# Patient Record
Sex: FEMALE | Race: WHITE | Employment: FULL TIME | URBAN - METROPOLITAN AREA
[De-identification: names, ages, dates, MRNs, and addresses within clinical notes are randomized per-mention and may not be internally consistent; named-entity substitution may affect disease eponyms.]

---

## 2017-05-03 ENCOUNTER — EMPLOYEE WELLNESS (OUTPATIENT)
Dept: OTHER | Age: 62
End: 2017-05-03

## 2017-05-03 LAB
CHOLESTEROL, TOTAL: 225 MG/DL (ref 0–199)
GLUCOSE BLD-MCNC: 92 MG/DL (ref 74–107)
HDLC SERPL-MCNC: 58 MG/DL
LDL CHOLESTEROL CALCULATED: 129 MG/DL (ref 0–99)
TRIGL SERPL-MCNC: 190 MG/DL (ref 0–149)

## 2018-03-20 VITALS — WEIGHT: 136 LBS | BODY MASS INDEX: 21.95 KG/M2

## 2018-05-15 ENCOUNTER — EMPLOYEE WELLNESS (OUTPATIENT)
Dept: OTHER | Age: 63
End: 2018-05-15

## 2018-05-15 LAB
CHOLESTEROL, TOTAL: 143 MG/DL (ref 0–199)
GLUCOSE BLD-MCNC: 89 MG/DL (ref 74–107)
HDLC SERPL-MCNC: 53 MG/DL
LDL CHOLESTEROL CALCULATED: 65 MG/DL (ref 0–99)
TRIGL SERPL-MCNC: 126 MG/DL (ref 0–149)

## 2018-05-21 VITALS — BODY MASS INDEX: 21.63 KG/M2 | WEIGHT: 134 LBS

## 2018-05-24 ENCOUNTER — HOSPITAL ENCOUNTER (OUTPATIENT)
Dept: GENERAL RADIOLOGY | Age: 63
Discharge: HOME | End: 2018-05-26
Payer: COMMERCIAL

## 2018-05-24 ENCOUNTER — HOSPITAL ENCOUNTER (OUTPATIENT)
Age: 63
Discharge: HOME | End: 2018-05-26
Payer: COMMERCIAL

## 2018-05-24 DIAGNOSIS — M54.5 LOW BACK PAIN, UNSPECIFIED BACK PAIN LATERALITY, UNSPECIFIED CHRONICITY, WITH SCIATICA PRESENCE UNSPECIFIED: ICD-10-CM

## 2018-05-24 PROCEDURE — 72110 X-RAY EXAM L-2 SPINE 4/>VWS: CPT

## 2018-05-24 PROCEDURE — 72072 X-RAY EXAM THORAC SPINE 3VWS: CPT

## 2018-05-30 ENCOUNTER — HOSPITAL ENCOUNTER (OUTPATIENT)
Dept: PHYSICAL THERAPY | Age: 63
Setting detail: THERAPIES SERIES
Discharge: HOME | End: 2018-05-30
Payer: COMMERCIAL

## 2018-05-30 PROCEDURE — 97161 PT EVAL LOW COMPLEX 20 MIN: CPT

## 2018-05-30 PROCEDURE — G8978 MOBILITY CURRENT STATUS: HCPCS

## 2018-05-30 PROCEDURE — G8979 MOBILITY GOAL STATUS: HCPCS

## 2018-06-05 ENCOUNTER — HOSPITAL ENCOUNTER (OUTPATIENT)
Dept: PHYSICAL THERAPY | Age: 63
Setting detail: THERAPIES SERIES
Discharge: HOME | End: 2018-06-05
Payer: COMMERCIAL

## 2018-06-05 PROCEDURE — 97530 THERAPEUTIC ACTIVITIES: CPT

## 2018-06-05 PROCEDURE — 97110 THERAPEUTIC EXERCISES: CPT

## 2018-06-08 ENCOUNTER — HOSPITAL ENCOUNTER (OUTPATIENT)
Dept: PHYSICAL THERAPY | Age: 63
Setting detail: THERAPIES SERIES
Discharge: HOME | End: 2018-06-08
Payer: COMMERCIAL

## 2018-06-08 PROCEDURE — 97110 THERAPEUTIC EXERCISES: CPT

## 2018-06-14 ENCOUNTER — HOSPITAL ENCOUNTER (OUTPATIENT)
Dept: PHYSICAL THERAPY | Age: 63
Setting detail: THERAPIES SERIES
Discharge: HOME | End: 2018-06-14
Payer: COMMERCIAL

## 2018-06-14 PROCEDURE — 97110 THERAPEUTIC EXERCISES: CPT

## 2018-06-15 PROCEDURE — G8980 MOBILITY D/C STATUS: HCPCS

## 2018-06-15 PROCEDURE — G8979 MOBILITY GOAL STATUS: HCPCS

## 2018-06-28 ENCOUNTER — HOSPITAL ENCOUNTER (OUTPATIENT)
Dept: GENERAL RADIOLOGY | Age: 63
Discharge: HOME | End: 2018-06-30
Payer: COMMERCIAL

## 2018-06-28 DIAGNOSIS — Z12.31 VISIT FOR SCREENING MAMMOGRAM: ICD-10-CM

## 2018-06-28 PROCEDURE — 77063 BREAST TOMOSYNTHESIS BI: CPT

## 2018-12-15 ENCOUNTER — NURSE TRIAGE (OUTPATIENT)
Dept: OTHER | Facility: CLINIC | Age: 63
End: 2018-12-15

## 2018-12-20 ENCOUNTER — HOSPITAL ENCOUNTER (OUTPATIENT)
Dept: CT IMAGING | Age: 63
Discharge: HOME | End: 2018-12-22
Payer: COMMERCIAL

## 2018-12-20 DIAGNOSIS — S92.211A CLOSED DISPLACED FRACTURE OF CUBOID OF RIGHT FOOT, INITIAL ENCOUNTER: ICD-10-CM

## 2018-12-20 PROCEDURE — 73700 CT LOWER EXTREMITY W/O DYE: CPT

## 2019-01-29 ENCOUNTER — HOSPITAL ENCOUNTER (OUTPATIENT)
Age: 64
Discharge: HOME | End: 2019-01-29
Payer: COMMERCIAL

## 2019-01-29 LAB
ALBUMIN SERPL-MCNC: 4.2 G/DL (ref 3.5–5.2)
ALP BLD-CCNC: 47 U/L (ref 35–104)
ALT SERPL-CCNC: 28 U/L (ref 0–32)
ANION GAP SERPL CALCULATED.3IONS-SCNC: 15 MMOL/L (ref 7–16)
AST SERPL-CCNC: 27 U/L (ref 0–31)
BILIRUB SERPL-MCNC: 0.4 MG/DL (ref 0–1.2)
BUN BLDV-MCNC: 33 MG/DL (ref 8–23)
CALCIUM SERPL-MCNC: 9.2 MG/DL (ref 8.6–10.2)
CHLORIDE BLD-SCNC: 102 MMOL/L (ref 98–107)
CHOLESTEROL, TOTAL: 187 MG/DL (ref 0–199)
CO2: 25 MMOL/L (ref 22–29)
CREAT SERPL-MCNC: 1.2 MG/DL (ref 0.5–1)
GFR AFRICAN AMERICAN: 55
GFR NON-AFRICAN AMERICAN: 46 ML/MIN/1.73
GLUCOSE BLD-MCNC: 109 MG/DL (ref 74–99)
HCT VFR BLD CALC: 39.6 % (ref 34–48)
HDLC SERPL-MCNC: 51 MG/DL
HEMOGLOBIN: 13.4 G/DL (ref 11.5–15.5)
LDL CHOLESTEROL CALCULATED: 105 MG/DL (ref 0–99)
MCH RBC QN AUTO: 30.5 PG (ref 26–35)
MCHC RBC AUTO-ENTMCNC: 33.8 % (ref 32–34.5)
MCV RBC AUTO: 90.2 FL (ref 80–99.9)
PDW BLD-RTO: 11.9 FL (ref 11.5–15)
PLATELET # BLD: 226 E9/L (ref 130–450)
PMV BLD AUTO: 10.6 FL (ref 7–12)
POTASSIUM SERPL-SCNC: 3.7 MMOL/L (ref 3.5–5)
RBC # BLD: 4.39 E12/L (ref 3.5–5.5)
SODIUM BLD-SCNC: 142 MMOL/L (ref 132–146)
TOTAL PROTEIN: 7.1 G/DL (ref 6.4–8.3)
TRIGL SERPL-MCNC: 153 MG/DL (ref 0–149)
TSH SERPL DL<=0.05 MIU/L-ACNC: 0.79 UIU/ML (ref 0.27–4.2)
VLDLC SERPL CALC-MCNC: 31 MG/DL
WBC # BLD: 7.1 E9/L (ref 4.5–11.5)

## 2019-01-29 PROCEDURE — 80061 LIPID PANEL: CPT

## 2019-01-29 PROCEDURE — 85027 COMPLETE CBC AUTOMATED: CPT

## 2019-01-29 PROCEDURE — 84443 ASSAY THYROID STIM HORMONE: CPT

## 2019-01-29 PROCEDURE — 36415 COLL VENOUS BLD VENIPUNCTURE: CPT

## 2019-01-29 PROCEDURE — 80053 COMPREHEN METABOLIC PANEL: CPT

## 2019-05-08 ENCOUNTER — HOSPITAL ENCOUNTER (OUTPATIENT)
Dept: PHYSICAL THERAPY | Age: 64
Setting detail: THERAPIES SERIES
Discharge: HOME | End: 2019-05-08
Payer: COMMERCIAL

## 2019-05-08 PROCEDURE — 97162 PT EVAL MOD COMPLEX 30 MIN: CPT

## 2019-05-08 NOTE — PROGRESS NOTES
[address] [facility]                Phone: [phone]   Fax: [phone]    Physical Therapy Daily Treatment Note  Date:  2019    Patient Name:  Damon Luu    :  1955  MRN: [id number]    Referring Physician:  Dr. Kee Pham. [facility]  Insurance Information:  [employer]      Evaluation date:  2019  Diagnosis:  R ankle sprain and foot fractures  Treatment diagnosis:  Decreased R ankle ROM, R ankle weakness, impaired gait mechanics  Evaluating Physical Therapist:  Ko Grijalva PT, DPT        Visit# / total visits:     Time In:    Time Out:      Subjective:      Exercises:   Exercise/Equipment Resistance/Repetitions Other comments      TM vs Bike                Ankle traction                Ankle 5-way                 Gait training                Steps  Reciprocal                                                                                            Assessment/Comments:      Home Exercise Program:        Treatment/Activity Tolerance:  [] Patient tolerated treatment well [] Patient limited by fatigue  [] Patient limited by pain  [] Patient limited by other medical complications  [] Other:     Prognosis: [x] Good [] Fair  [] Poor    Patient Requires Follow-up: [x] Yes  [] No    Plan:   [] Continue per plan of care [] Alter current plan (see comments)  [x] Plan of care initiated [] Hold pending MD visit [] Discharge    Plan for Next Session:  Begin R ankle ROM and strengthening exercises as well as gait and stair training.     See Progress Note: []  Yes  [x]  No          Electronically signed by:  Ko Grijalva PT, DPT
and R heel whip. Steps:  Pt ascended 4 steps with 2 HR's reciprocally and descended 4 steps with 2 HR's step-to pattern modified independent. Additional Comments:  Pt instructed to bring tennis shoes with her to next visit. Pt stated she has GTB at home and was using it for R ankle lateral motion previously. Summary/Assessment:    Pt presents to outpatient physical therapy following R foot fractures and R ankle sprain. Pt would benefit from PT to increase R ankle ROM, improve R ankle strength, and normalize gait mechanics. Plan:     Below checked are areas for improvement during physical therapy POC:        [x]  Pain reduction  []  Balance Improvement       [x]  Strengthening  []  Postural Improvement   [x]  Range of Motion  [x]  Soft Tissue Improvement    [x]  Gait Training   []  Other:      [x]  Home Exercise Program      Pt will be see for 2 visits per week for 4 weeks for a total of 8 visits to accomplish goals set below:        Short/Long Term Goals: (4 weeks)      1. Pt will report decreased R ankle pain to 0-2/10 with activity. 2.  Pt will improve R ankle ROM with passive overpressure to [facility] throughout. 3.  Pt will improve R ankle strength to at least 4+/5 throughout. 4.  Pt will ambulate >300' with normal gait mechanics. 5.  Pt will ascend/descend 12 steps with 1 HR modified independent with reciprocal gait pattern. 6.  Pt will be independent with HEP. Pt's potential for reaching Physical Therapy goals: good.       Time In:  1300  Time Out:  Chuy Zavala, PT, DPT

## 2019-05-10 ENCOUNTER — HOSPITAL ENCOUNTER (OUTPATIENT)
Dept: PHYSICAL THERAPY | Age: 64
Setting detail: THERAPIES SERIES
Discharge: HOME | End: 2019-05-10
Payer: COMMERCIAL

## 2019-05-10 PROCEDURE — 97530 THERAPEUTIC ACTIVITIES: CPT

## 2019-05-10 PROCEDURE — 97110 THERAPEUTIC EXERCISES: CPT

## 2019-05-10 NOTE — PROGRESS NOTES
[address] [facility]                Phone: [phone]   Fax: [phone]    Physical Therapy Daily Treatment Note  Date:  5/10/2019    Patient Name:  Marito Sawant    :  1955  MRN: [id number]    Referring Physician:  Dr. Evi Alvarez. [facility]  Insurance Information:  [employer] Plan      Evaluation date:  2019  Diagnosis:  R ankle sprain and foot fractures  Treatment diagnosis:  Decreased R ankle ROM, R ankle weakness, impaired gait mechanics  Evaluating Physical Therapist:  Navid Little PT, DPT        Visit# / total visits:     Time In:  1300  Time Out:  1400    Subjective:  Pt stated she has been more aware of her pain location since last visit. Pt feels her pain is more in her ankle than her foot now. Pt had not other new complaints this afternoon. Exercises:   Exercise/Equipment Resistance/Repetitions Other comments      TM vs Bike Bike 5:00, L5               Ankle traction With MFB, ankle DF/OH and INV/EV with PT assistance, 2 rounds               Ankle 5-way  Light flexband 20x each direction           BAPS vs Rocker board                Gait training                Steps  Reciprocal                                                                                            Assessment/Comments:  Pt reported decreased pain with ambulation following ankle traction. Pt continues to have min-mod residual swelling around R ankle and into mid-foot. Pt given demonstrations and verbal cues for proper form with ankle 5-way exercises this afternoon. Pt fatigued quickly with ankle strengthening exercises.       Home Exercise Program:  5/10/2019 - ankle 5-way 2x20 2x/day (handout administered)      Treatment/Activity Tolerance:  [x] Patient tolerated treatment well [] Patient limited by fatigue  [] Patient limited by pain  [] Patient limited by other medical complications  [] Other:     Prognosis: [x] Good [] Fair  [] Poor    Patient Requires Follow-up: [x] Yes  []

## 2019-05-14 ENCOUNTER — HOSPITAL ENCOUNTER (OUTPATIENT)
Dept: PHYSICAL THERAPY | Age: 64
Setting detail: THERAPIES SERIES
Discharge: HOME | End: 2019-05-14
Payer: COMMERCIAL

## 2019-05-14 ENCOUNTER — EMPLOYEE WELLNESS (OUTPATIENT)
Dept: OTHER | Age: 64
End: 2019-05-14

## 2019-05-14 LAB
CHOLESTEROL, TOTAL: 185 MG/DL (ref 0–199)
GLUCOSE BLD-MCNC: 98 MG/DL (ref 74–107)
HDLC SERPL-MCNC: 58 MG/DL
LDL CHOLESTEROL CALCULATED: 97 MG/DL (ref 0–99)
TRIGL SERPL-MCNC: 148 MG/DL (ref 0–149)

## 2019-05-14 PROCEDURE — 97110 THERAPEUTIC EXERCISES: CPT

## 2019-05-14 NOTE — PROGRESS NOTES
[address] [facility]                Phone: [phone]   Fax: [phone]    Physical Therapy Daily Treatment Note  Date:  2019    Patient Name:  Octavia Hood    :  1955  MRN: [id number]    Referring Physician:  Dr. Dahiana Arias. [facility]  Insurance Information:  Medical [facility]      Evaluation date:  2019  Diagnosis:  R ankle sprain and foot fractures  Treatment diagnosis:  Decreased R ankle ROM, R ankle weakness, impaired gait mechanics  Evaluating Physical Therapist:  Marysol Mauro, PT, DPT        Visit# / total visits:  3/8   Time In:  4271  Time Out:  1405    Subjective:    Pt reports that her ankle was really sore this AM.  Tylenol helped to \"take the edge off\" of her pain per pt report. She reports doing some walking around in the grass helping with yard work this weekend which she thinks may have increased her R foot/ ankle soreness. No other complaints at this time. Exercises:   Exercise/Equipment Resistance/Repetitions Other comments      TM vs Bike Bike 5:00, L5 L1 today               Ankle traction With MFB, ankle DF/KS and INV/EV with PT assistance, 2 rounds               Ankle 5-way  Light flexband 20x each direction           BAPS vs  20x each direction  Level #4    Calf raises  3x10     NT Gait training             NT Steps  Reciprocal                                                                                            Assessment/Comments:    Pt reports \"less stiffness\" in R ankle following strengthening, ROM, and distraction with bands today. Mild calf muscle fatigue following calf raises. Very slight increase in pain with R ankle eversion on strengthening exercises that subsides immediately following exercise. Pt re-educated on proper application of bands with HEP ankle 5 way strengthening. She took pictures of band set-up on her phone and verbalized understanding.        Home Exercise Program:  5/10/2019 - ankle 5-way 2x20 2x/day (handout administered)      Treatment/Activity Tolerance:  [x] Patient tolerated treatment well [] Patient limited by fatigue  [] Patient limited by pain  [] Patient limited by other medical complications  [] Other:     Prognosis: [x] Good [] Fair  [] Poor    Patient Requires Follow-up: [x] Yes  [] No    Plan:   [x] Continue per plan of care [] Alter current plan (see comments)  [] Plan of care initiated [] Hold pending MD visit [] Discharge    Plan for Next Session:  Progress R ankle ROM and strengthening exercises as well as gait and stair training.     See Progress Note: []  Yes  [x]  No          Electronically signed by:   Pedro Kern DPT  [id number]

## 2019-05-17 ENCOUNTER — HOSPITAL ENCOUNTER (OUTPATIENT)
Dept: PHYSICAL THERAPY | Age: 64
Setting detail: THERAPIES SERIES
Discharge: HOME | End: 2019-05-17
Payer: COMMERCIAL

## 2019-05-17 PROCEDURE — 97110 THERAPEUTIC EXERCISES: CPT

## 2019-05-17 PROCEDURE — 97530 THERAPEUTIC ACTIVITIES: CPT

## 2019-05-17 NOTE — PROGRESS NOTES
[address] [facility]                Phone: [phone]   Fax: [phone]    Physical Therapy Daily Treatment Note  Date:  2019    Patient Name:  Diann Cornejo    :  1955  MRN: [id number]    Referring Physician:  Dr. Abbi Ponce. [facility]  Insurance Information:  Medical [facility] [employer] [facility]      Evaluation date:  2019  Diagnosis:  R ankle sprain and foot fractures  Treatment diagnosis:  Decreased R ankle ROM, R ankle weakness, impaired gait mechanics  Evaluating Physical Therapist:  Abel Garcia, PT, DPT        Visit# / total visits:     Time In:  1300  Time Out:  1400    Subjective:  Pt reported pain in her R foot and ankle over the last few days so she has not done her HEP. Exercises:   Exercise/Equipment Resistance/Repetitions Other comments      TM vs Bike Bike 5:00, L5            NT   Ankle traction With MFB, ankle DF/IA and INV/EV with PT assistance, 2 rounds               Ankle 5-way  Light flexband 20x each direction            vs Rocker board 20x F/B and R/L      Calf raises  3x10 R LE only     Gait training Throughout PT clinic - please see comments below. NT Steps  Reciprocal                                                                                            Assessment/Comments:  Spent great deal of time reviewing ankle 5-way exercises this afternoon and demonstrating proper placement of light flexband with pt. Pt still very fearful of injuring ankle again and also of falling again. PT provided reassurance throughout session. While walking, pt continues to demonstrate stiff, guarded movements of R LE as well as decreased heel strike and toe-off. Pt instructed to work on ankle 5-way over the weekend until next visit on 2019.         Home Exercise Program:  5/10/2019 - ankle 5-way 2x20 2x/day (handout administered)      Treatment/Activity Tolerance:  [x] Patient tolerated treatment well [] Patient limited by fatigue  [] Patient limited by pain  [] Patient limited by other medical complications  [] Other:     Prognosis: [x] Good [] Fair  [] Poor    Patient Requires Follow-up: [x] Yes  [] No    Plan:   [x] Continue per plan of care [] Alter current plan (see comments)  [] Plan of care initiated [] Hold pending MD visit [] Discharge    Plan for Next Session:  Progress R ankle ROM and strengthening exercises as well as gait and stair training.     See Progress Note: []  Yes  [x]  No          Electronically signed by:   Verona Malloy, PT, DPT   [id number]

## 2019-05-21 ENCOUNTER — HOSPITAL ENCOUNTER (OUTPATIENT)
Dept: PHYSICAL THERAPY | Age: 64
Setting detail: THERAPIES SERIES
Discharge: HOME | End: 2019-05-21
Payer: COMMERCIAL

## 2019-05-21 PROCEDURE — 97530 THERAPEUTIC ACTIVITIES: CPT

## 2019-05-21 PROCEDURE — 97110 THERAPEUTIC EXERCISES: CPT

## 2019-05-21 NOTE — PROGRESS NOTES
[address] [facility]                Phone: [phone]   Fax: [phone]    Physical Therapy Daily Treatment Note  Date:  2019    Patient Name:  Sara Ortiz    :  1955  MRN: [id number]    Referring Physician:  Dr. An Pedraza. [facility]  Insurance Information:  Medical [facility] [facility]      Evaluation date:  2019  Diagnosis:  R ankle sprain and foot fractures  Treatment diagnosis:  Decreased R ankle ROM, R ankle weakness, impaired gait mechanics  Evaluating Physical Therapist:  Patience Oconnell PT, DPT        Visit# / total visits:     Time In:  1300  Time Out:  1354    Subjective:  Pt stated she felt really good on Saturday but had some swelling again last evening after being on her feet a lot after work. Pt stated overall she feels as though her ankle is improving. Exercises:   Exercise/Equipment Resistance/Repetitions Other comments      TM vs Bike Bike 10:00, L5            NT   Ankle traction With MFB, ankle DF/CT and INV/EV with PT assistance, 2 rounds            NT   Ankle 5-way  Light flexband 20x each direction            vs Rocker board 2x20 F/B and R/L      Calf raises  4x10 R LE only     Single-leg stance  R LE only  Static - level surface to fatigue  Uneven surface    Dynamic            Squat  B LE's    R LE only           TM 10:00, 0% grade, comfortable pace set by pt           Gait training Throughout PT clinic Focus on improving gait mechanics, especially at R knee. NT Steps  Reciprocal                            Assessment/Comments:  Pt better able to tolerate exercises this afternoon and noted to be less fearful of using R ankle. Pt's gait has improved since initial evaluation but noted slight antalgic gait still. Pt given demonstration and verbal instructions for proper gait mechanics. Also focused on gait mechanics while walking on treadmill. Started balance activities as well this afternoon.   Pt had difficulty with single-leg stance but again, less fearful of using R ankle. Pt instructed to continue with current HEP. Home Exercise Program:  5/10/2019 - ankle 5-way 2x20 2x/day (handout administered)      Treatment/Activity Tolerance:  [x] Patient tolerated treatment well [] Patient limited by fatigue  [] Patient limited by pain  [] Patient limited by other medical complications  [] Other:     Prognosis: [x] Good [] Fair  [] Poor    Patient Requires Follow-up: [x] Yes  [] No    Plan:   [x] Continue per plan of care [] Alter current plan (see comments)  [] Plan of care initiated [] Hold pending MD visit [] Discharge    Plan for Next Session:  Progress R ankle ROM and strengthening exercises as well as gait and stair training.     See Progress Note: []  Yes  [x]  No          Electronically signed by:   Erika Anderson, PT, DPT   [id number]

## 2019-05-24 ENCOUNTER — HOSPITAL ENCOUNTER (OUTPATIENT)
Dept: PHYSICAL THERAPY | Age: 64
Setting detail: THERAPIES SERIES
Discharge: HOME | End: 2019-05-24
Payer: COMMERCIAL

## 2019-05-24 PROCEDURE — 97530 THERAPEUTIC ACTIVITIES: CPT

## 2019-05-24 PROCEDURE — 97110 THERAPEUTIC EXERCISES: CPT

## 2019-05-24 NOTE — PROGRESS NOTES
[address] [facility]                Phone: [phone]   Fax: [phone]    Physical Therapy Daily Treatment Note  Date:  2019    Patient Name:  Umu Delong    :  1955  MRN: [id number]    Referring Physician:  Dr. Yonatan Valle. [facility]  Insurance Information:  Medical [facility]      Evaluation date:  2019  Diagnosis:  R ankle sprain and foot fractures  Treatment diagnosis:  Decreased R ankle ROM, R ankle weakness, impaired gait mechanics  Evaluating Physical Therapist:  Isaac Dewitt PT, DPT        Visit# / total visits:     Time In:  1300  Time Out:  1355    Subjective:  Pt stated she still has 4-5/10 pain when walking over lateral aspect of R foot. Pt has still been working on [employer] as instructed. Pt stated she was able to walk . 5 miles outside on Tuesday and Thursday. Exercises:   Exercise/Equipment Resistance/Repetitions Other comments      TM vs Bike Bike 10:00, L5            NT   Ankle traction With MFB, ankle DF/MO and INV/EV with PT assistance, 2 rounds            NT   Ankle 5-way  Light flexband 20x each direction            vs Rocker board 2x20 F/B and R/L      Calf raises  4x10 R LE only     Single-leg stance  R LE only  Static - level surface to fatigue  Uneven surface - blue foam, to fatigue    Dynamic            Squat  B LE's    R LE only - 2x20           TM 6:00, 0% grade, comfortable pace set by pt Focus on gait mechanics. Gait training Throughout PT clinic Focus on improving gait mechanics, especially at R knee. NT Steps  Reciprocal                            Assessment/Comments:  Pt tolerating balance exercises better than previous sessions. Pt continues to rely on parallel bars at times for support but overall, noted improvement in R LE balance on level surface. Pt instructed to continue with ankle 5-way at home and encouraged to continue increasing activity (i.e., walking outdoors for exercise).       Home Exercise Program:  5/10/2019 - ankle 5-way 2x20 2x/day (handout administered)      Treatment/Activity Tolerance:  [x] Patient tolerated treatment well [] Patient limited by fatigue  [] Patient limited by pain  [] Patient limited by other medical complications  [] Other:     Prognosis: [x] Good [] Fair  [] Poor    Patient Requires Follow-up: [x] Yes  [] No    Plan:   [x] Continue per plan of care [] Alter current plan (see comments)  [] Plan of care initiated [] Hold pending MD visit [] Discharge    Plan for Next Session:  Progress R ankle ROM and strengthening exercises as well as gait and stair training.     See Progress Note: []  Yes  [x]  No          Electronically signed by:   Heather Osborne, PT, DPT   [id number]

## 2019-05-28 ENCOUNTER — HOSPITAL ENCOUNTER (OUTPATIENT)
Dept: PHYSICAL THERAPY | Age: 64
Setting detail: THERAPIES SERIES
Discharge: HOME | End: 2019-05-28
Payer: COMMERCIAL

## 2019-05-28 VITALS — BODY MASS INDEX: 22.44 KG/M2 | WEIGHT: 139 LBS

## 2019-05-28 PROCEDURE — 97110 THERAPEUTIC EXERCISES: CPT

## 2019-05-28 PROCEDURE — 97530 THERAPEUTIC ACTIVITIES: CPT

## 2019-05-31 ENCOUNTER — APPOINTMENT (OUTPATIENT)
Dept: PHYSICAL THERAPY | Age: 64
End: 2019-05-31
Payer: COMMERCIAL

## 2019-06-04 ENCOUNTER — HOSPITAL ENCOUNTER (OUTPATIENT)
Dept: PHYSICAL THERAPY | Age: 64
Setting detail: THERAPIES SERIES
Discharge: HOME | End: 2019-06-04
Payer: COMMERCIAL

## 2019-06-04 ENCOUNTER — HOSPITAL ENCOUNTER (OUTPATIENT)
Dept: GENERAL RADIOLOGY | Age: 64
Discharge: HOME | End: 2019-06-06
Payer: COMMERCIAL

## 2019-06-04 DIAGNOSIS — Z12.39 BREAST CANCER SCREENING: ICD-10-CM

## 2019-06-04 DIAGNOSIS — N95.1 MENOPAUSAL SYMPTOM: ICD-10-CM

## 2019-06-04 PROCEDURE — 77080 DXA BONE DENSITY AXIAL: CPT

## 2019-06-04 PROCEDURE — 97530 THERAPEUTIC ACTIVITIES: CPT

## 2019-06-04 PROCEDURE — 77063 BREAST TOMOSYNTHESIS BI: CPT

## 2019-06-04 NOTE — DISCHARGE SUMMARY
[address] [facility]                Phone: [phone]   Fax: [phone]      Date: 2019  To:  Dr. Marva Mendiola. [place]   From: Nish Lozoya PT, SOHANT    Patient: Yasmin Gaytan         : 1955  Diagnosis:  R ankle sprain and foot fractures        Treatment Diagnosis:  Decreased R ankle ROM, R ankle weakness, impaired gait mechanics    Physical Therapy Progress/Discharge Note    Total Visits to date:   8 Cancels/No-shows to date:  0    Plan of Care/Treatment to date:  [x] Therapeutic Exercise    [] Modalities:  [x] Therapeutic Activity     [] Ultrasound  [] Electrical Stimulation  [x] Gait Training      [] Cervical Traction   [] Lumbar Traction  [x] Neuromuscular Re-education  [] Cold/hotpack [] Iontophoresis  [] Instruction in HEP      Other:  [] Manual Therapy       []    [] Aquatic Therapy       []                          Significant Findings At Last Visit/Comments:    R ankle strength:  DF 5/5  PF 4+/5  INV 4+/5  EV 4/5     Gait: B foot supination (R slightly more than L); otherwise, normal gait mechanics     Steps:  4 steps with 2 HR's ascend reciprocal, descend step-to              12 steps with 2 HR's reciprocally - normal mechanics              12 steps with 1 HR reciprocally - difficulty descending with R LE              16 steps with 1 HR reciprocally (after step-down reps) - improved/normal mechanics      Goal Status:  [x] Achieved [] Partially Achieved  [] Not Achieved      Rehab Potential: [x] Excellent [] Good [] Fair  [] Poor        Patient Status: [] Continue per initial plan of Care. [x] Patient now discharged. [] Additional visits requested, Please re-certify for additional visits:      Requested frequency/duration:  X/week for weeks    Electronically signed by:  Nish Lozoya PT, DPT    If you have any questions or concerns, please don't hesitate to call.   Thank you for your referral.

## 2019-06-04 NOTE — PROGRESS NOTES
descend step-to   12 steps with 2 HR's reciprocally - normal mechanics   12 steps with 1 HR reciprocally - difficulty descending with R LE   16 steps with 1 HR reciprocally (after step-down reps) - improved/normal mechanics    Assessment/Comments:  Pt demonstrated normal gait mechanics this afternoon and also improved/normal mechanics on steps by end of session. PT and pt had lengthy discussion regarding pt's progress thus far in PT. Pt agreeable to discharge at this time. Pt educated extensively on importance of continuing with R ankle 5-way strengthening exercises and continuing with single-leg balance activities. Pt verbalized understanding. Home Exercise Program:  5/10/2019 - ankle 5-way 2x20 2x/day (handout administered)   5/28/2019 - added balance activities      Treatment/Activity Tolerance:  [x] Patient tolerated treatment well [] Patient limited by fatigue  [] Patient limited by pain  [] Patient limited by other medical complications  [] Other:     Prognosis: [x] Good [] Fair  [] Poor    Patient Requires Follow-up: [] Yes  [x] No    Plan:   [] Continue per plan of care [] Alter current plan (see comments)  [] Plan of care initiated [] Hold pending MD visit [x] Discharge    Plan for Next Session:  Pt now discharged.     See Progress Note: []  Yes  [x]  No          Electronically signed by:   Abel Garcia, PT, DPT   [id number]

## 2019-06-05 ENCOUNTER — HOSPITAL ENCOUNTER (OUTPATIENT)
Dept: CT IMAGING | Age: 64
Discharge: HOME | End: 2019-06-07
Payer: COMMERCIAL

## 2019-06-05 ENCOUNTER — HOSPITAL ENCOUNTER (OUTPATIENT)
Age: 64
Discharge: HOME | End: 2019-06-07
Payer: COMMERCIAL

## 2019-06-05 DIAGNOSIS — R11.0 NAUSEA: ICD-10-CM

## 2019-06-05 DIAGNOSIS — R14.0 ABDOMINAL BLOATING: ICD-10-CM

## 2019-06-05 DIAGNOSIS — R10.9 ABDOMINAL PAIN, UNSPECIFIED ABDOMINAL LOCATION: ICD-10-CM

## 2019-06-05 PROCEDURE — 74176 CT ABD & PELVIS W/O CONTRAST: CPT

## 2019-06-07 ENCOUNTER — APPOINTMENT (OUTPATIENT)
Dept: PHYSICAL THERAPY | Age: 64
End: 2019-06-07
Payer: COMMERCIAL

## 2019-06-07 ENCOUNTER — HOSPITAL ENCOUNTER (OUTPATIENT)
Age: 64
Discharge: HOME | End: 2019-06-07
Payer: COMMERCIAL

## 2019-06-07 LAB — VITAMIN D 25-HYDROXY: 49 NG/ML (ref 30–100)

## 2019-06-07 PROCEDURE — 82306 VITAMIN D 25 HYDROXY: CPT

## 2019-06-07 PROCEDURE — 36415 COLL VENOUS BLD VENIPUNCTURE: CPT

## 2020-03-03 ENCOUNTER — HOSPITAL ENCOUNTER (OUTPATIENT)
Age: 65
Discharge: HOME | End: 2020-03-03
Payer: COMMERCIAL

## 2020-03-03 LAB
ALBUMIN SERPL-MCNC: 4 G/DL (ref 3.5–5.2)
ALP BLD-CCNC: 41 U/L (ref 35–104)
ALT SERPL-CCNC: 24 U/L (ref 0–32)
ANION GAP SERPL CALCULATED.3IONS-SCNC: 16 MMOL/L (ref 7–16)
AST SERPL-CCNC: 24 U/L (ref 0–31)
BILIRUB SERPL-MCNC: 0.6 MG/DL (ref 0–1.2)
BUN BLDV-MCNC: 19 MG/DL (ref 8–23)
CALCIUM SERPL-MCNC: 9.5 MG/DL (ref 8.6–10.2)
CHLORIDE BLD-SCNC: 103 MMOL/L (ref 98–107)
CHOLESTEROL, TOTAL: 162 MG/DL (ref 0–199)
CO2: 21 MMOL/L (ref 22–29)
CREAT SERPL-MCNC: 1 MG/DL (ref 0.5–1)
GFR AFRICAN AMERICAN: >60
GFR NON-AFRICAN AMERICAN: 56 ML/MIN/1.73
GLUCOSE BLD-MCNC: 90 MG/DL (ref 74–99)
HCT VFR BLD CALC: 42 % (ref 34–48)
HDLC SERPL-MCNC: 50 MG/DL
HEMOGLOBIN: 13.6 G/DL (ref 11.5–15.5)
LDL CHOLESTEROL CALCULATED: 84 MG/DL (ref 0–99)
MCH RBC QN AUTO: 31 PG (ref 26–35)
MCHC RBC AUTO-ENTMCNC: 32.4 % (ref 32–34.5)
MCV RBC AUTO: 95.7 FL (ref 80–99.9)
PDW BLD-RTO: 12.2 FL (ref 11.5–15)
PLATELET # BLD: 227 E9/L (ref 130–450)
PMV BLD AUTO: 11.7 FL (ref 7–12)
POTASSIUM SERPL-SCNC: 3.9 MMOL/L (ref 3.5–5)
RBC # BLD: 4.39 E12/L (ref 3.5–5.5)
SODIUM BLD-SCNC: 140 MMOL/L (ref 132–146)
TOTAL PROTEIN: 7 G/DL (ref 6.4–8.3)
TRIGL SERPL-MCNC: 142 MG/DL (ref 0–149)
VLDLC SERPL CALC-MCNC: 28 MG/DL
WBC # BLD: 4.9 E9/L (ref 4.5–11.5)

## 2020-03-03 PROCEDURE — 80061 LIPID PANEL: CPT

## 2020-03-03 PROCEDURE — 80053 COMPREHEN METABOLIC PANEL: CPT

## 2020-03-03 PROCEDURE — 85027 COMPLETE CBC AUTOMATED: CPT

## 2020-03-03 PROCEDURE — 36415 COLL VENOUS BLD VENIPUNCTURE: CPT

## 2020-07-09 ENCOUNTER — HOSPITAL ENCOUNTER (OUTPATIENT)
Age: 65
Discharge: HOME | End: 2020-07-11
Payer: COMMERCIAL

## 2020-07-09 PROCEDURE — G0123 SCREEN CERV/VAG THIN LAYER: HCPCS

## 2020-07-29 ENCOUNTER — EMPLOYEE WELLNESS (OUTPATIENT)
Dept: OTHER | Age: 65
End: 2020-07-29

## 2020-07-29 LAB
CHOLESTEROL, TOTAL: 181 MG/DL (ref 0–199)
GLUCOSE BLD-MCNC: 92 MG/DL (ref 74–107)
HDLC SERPL-MCNC: 49 MG/DL
LDL CHOLESTEROL CALCULATED: 92 MG/DL (ref 0–99)
TRIGL SERPL-MCNC: 198 MG/DL (ref 0–149)

## 2020-07-30 ENCOUNTER — HOSPITAL ENCOUNTER (OUTPATIENT)
Dept: GENERAL RADIOLOGY | Age: 65
Discharge: HOME | End: 2020-08-01
Payer: COMMERCIAL

## 2020-07-30 PROCEDURE — 77063 BREAST TOMOSYNTHESIS BI: CPT

## 2020-10-19 VITALS — WEIGHT: 148 LBS | BODY MASS INDEX: 23.89 KG/M2

## 2021-03-30 ENCOUNTER — HOSPITAL ENCOUNTER (OUTPATIENT)
Age: 66
Discharge: HOME | End: 2021-03-30
Payer: COMMERCIAL

## 2021-03-30 LAB
ALBUMIN SERPL-MCNC: 4 G/DL (ref 3.5–5.2)
ALP BLD-CCNC: 43 U/L (ref 35–104)
ALT SERPL-CCNC: 27 U/L (ref 0–32)
ANION GAP SERPL CALCULATED.3IONS-SCNC: 11 MMOL/L (ref 7–16)
AST SERPL-CCNC: 28 U/L (ref 0–31)
BASOPHILS ABSOLUTE: 0.08 E9/L (ref 0–0.2)
BASOPHILS RELATIVE PERCENT: 1.5 % (ref 0–2)
BILIRUB SERPL-MCNC: 0.4 MG/DL (ref 0–1.2)
BUN BLDV-MCNC: 25 MG/DL (ref 8–23)
CALCIUM SERPL-MCNC: 9.1 MG/DL (ref 8.6–10.2)
CHLORIDE BLD-SCNC: 103 MMOL/L (ref 98–107)
CHOLESTEROL, TOTAL: 171 MG/DL (ref 0–199)
CO2: 29 MMOL/L (ref 22–29)
CREAT SERPL-MCNC: 1 MG/DL (ref 0.5–1)
EOSINOPHILS ABSOLUTE: 0.21 E9/L (ref 0.05–0.5)
EOSINOPHILS RELATIVE PERCENT: 3.9 % (ref 0–6)
GFR AFRICAN AMERICAN: >60
GFR NON-AFRICAN AMERICAN: 56 ML/MIN/1.73
GLUCOSE BLD-MCNC: 93 MG/DL (ref 74–99)
HCT VFR BLD CALC: 42.5 % (ref 34–48)
HDLC SERPL-MCNC: 55 MG/DL
HEMOGLOBIN: 13.9 G/DL (ref 11.5–15.5)
IMMATURE GRANULOCYTES #: 0.01 E9/L
IMMATURE GRANULOCYTES %: 0.2 % (ref 0–5)
LDL CHOLESTEROL CALCULATED: 95 MG/DL (ref 0–99)
LYMPHOCYTES ABSOLUTE: 1.71 E9/L (ref 1.5–4)
LYMPHOCYTES RELATIVE PERCENT: 31.8 % (ref 20–42)
MCH RBC QN AUTO: 30.5 PG (ref 26–35)
MCHC RBC AUTO-ENTMCNC: 32.7 % (ref 32–34.5)
MCV RBC AUTO: 93.2 FL (ref 80–99.9)
MONOCYTES ABSOLUTE: 0.45 E9/L (ref 0.1–0.95)
MONOCYTES RELATIVE PERCENT: 8.4 % (ref 2–12)
NEUTROPHILS ABSOLUTE: 2.92 E9/L (ref 1.8–7.3)
NEUTROPHILS RELATIVE PERCENT: 54.2 % (ref 43–80)
PDW BLD-RTO: 12 FL (ref 11.5–15)
PLATELET # BLD: 217 E9/L (ref 130–450)
PMV BLD AUTO: 10.7 FL (ref 7–12)
POTASSIUM SERPL-SCNC: 3.4 MMOL/L (ref 3.5–5)
RBC # BLD: 4.56 E12/L (ref 3.5–5.5)
SODIUM BLD-SCNC: 143 MMOL/L (ref 132–146)
TOTAL PROTEIN: 7 G/DL (ref 6.4–8.3)
TRIGL SERPL-MCNC: 107 MG/DL (ref 0–149)
VLDLC SERPL CALC-MCNC: 21 MG/DL
WBC # BLD: 5.4 E9/L (ref 4.5–11.5)

## 2021-03-30 PROCEDURE — 80053 COMPREHEN METABOLIC PANEL: CPT

## 2021-03-30 PROCEDURE — 85025 COMPLETE CBC W/AUTO DIFF WBC: CPT

## 2021-03-30 PROCEDURE — 36415 COLL VENOUS BLD VENIPUNCTURE: CPT

## 2021-03-30 PROCEDURE — 80061 LIPID PANEL: CPT

## 2021-08-22 ENCOUNTER — APPOINTMENT (OUTPATIENT)
Dept: CT IMAGING | Age: 66
End: 2021-08-22
Payer: COMMERCIAL

## 2021-08-22 ENCOUNTER — NURSE TRIAGE (OUTPATIENT)
Dept: OTHER | Facility: CLINIC | Age: 66
End: 2021-08-22

## 2021-08-22 ENCOUNTER — HOSPITAL ENCOUNTER (EMERGENCY)
Age: 66
Discharge: HOME | End: 2021-08-22
Payer: COMMERCIAL

## 2021-08-22 VITALS
DIASTOLIC BLOOD PRESSURE: 88 MMHG | SYSTOLIC BLOOD PRESSURE: 114 MMHG | HEART RATE: 70 BPM | TEMPERATURE: 98 F | RESPIRATION RATE: 16 BRPM | OXYGEN SATURATION: 94 %

## 2021-08-22 DIAGNOSIS — S00.83XA CONTUSION OF FACE, INITIAL ENCOUNTER: ICD-10-CM

## 2021-08-22 DIAGNOSIS — S01.81XA FACIAL LACERATION, INITIAL ENCOUNTER: Primary | ICD-10-CM
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
## 2021-08-22 PROCEDURE — 12011 RPR F/E/E/N/L/M 2.5 CM/<: CPT

## 2021-08-22 PROCEDURE — 70486 CT MAXILLOFACIAL W/O DYE: CPT

## 2021-08-22 PROCEDURE — 99282 EMERGENCY DEPT VISIT SF MDM: CPT

## 2021-08-22 PROCEDURE — 70450 CT HEAD/BRAIN W/O DYE: CPT

## 2021-08-22 RX ORDER — LIDOCAINE HYDROCHLORIDE AND EPINEPHRINE 10; 10 MG/ML; UG/ML
20 INJECTION, SOLUTION INFILTRATION; PERINEURAL ONCE
Status: DISCONTINUED | OUTPATIENT
Start: 2021-08-22 | End: 2021-08-22 | Stop reason: HOSPADM

## 2021-08-22 RX ORDER — LIDOCAINE HYDROCHLORIDE 10 MG/ML
5 INJECTION, SOLUTION INFILTRATION; PERINEURAL ONCE
Status: DISCONTINUED | OUTPATIENT
Start: 2021-08-22 | End: 2021-08-22

## 2021-08-22 ASSESSMENT — PAIN SCALES - GENERAL: PAINLEVEL_OUTOF10: 5

## 2021-08-22 ASSESSMENT — PAIN DESCRIPTION - LOCATION: LOCATION: TEETH

## 2021-08-22 NOTE — TELEPHONE ENCOUNTER
Reason for Disposition   Gaping cut of OUTER LIP  (or length > 1/4 inch or 6 mm)    Answer Assessment - Initial Assessment Questions  1. MECHANISM: \"How did the injury happen? \"       Tripped fell cut upper lip, knee and cheek as well    2. ONSET: \"When did the injury happen? \" (Minutes or hours ago)       1315    3. LOCATION: \"What part of the mouth is injured? \"       Upper lip    4. APPEARANCE of INJURY: \"What does the mouth look like? \"       Swollen, and split open    5. BLEEDING: \"Is the mouth still bleeding? \" If so, ask: \"Is it difficult to stop? \"       Yes, but has pressure but still seeping    6. SIZE: For cuts, bruises, or swelling, ask: \"How large is it? \" (e.g., inches or centimeters; entire lip)       Split wide open    7. PAIN: \"Is it painful? \" If so, ask: \"How bad is the pain? \"   (e.g., Scale 1-10; or mild, moderate, severe)      Yes 6/10 had novocain - was seen at an oral surgeon and the tooth put back in    8. TETANUS: For any breaks in the skin, ask: \"When was the last tetanus booster? \"      7 years ago    9. OTHER SYMPTOMS: Tamea Ankit you having any trouble breathing? \"      No    10. PREGNANCY: \"Is there any chance you are pregnant? \" \"When was your last menstrual period? \"        n/a    Protocols used: MOUTH INJURY-ADULT-    Brief description of triage: Emmett Le states that his wife fell scrapped knee, split her upper lip wide open and knocked tooth sideways. Caller states still bleeding and seeping. Triage indicates for patient to Go to ED Now. Care advice provided, patient verbalizes understanding; denies any other questions or concerns; instructed to call back for any new or worsening symptoms. Attention Provider: Thank you for allowing me to participate in the care of your patient. The patient was connected to triage in response to symptoms provided. Please do not respond through this encounter as the response is not directed to a shared pool.

## 2021-08-22 NOTE — ED PROVIDER NOTES
[facility]  Department of Emergency Medicine   ED  Encounter Note  Admit Date/RoomTime: 2021  5:27 PM  ED Room: [facility] [[email or web address]    NAME: Poppy Reed  : 1955  MRN: [id number]     Chief Complaint:  Fall (fall 3 hours ago - doesnt know what made her fall. Had a tooth come out and put back in by a dentist but still needs sutured on her upper lip. Denies LOC and states she only takes ASA)    History of Present Illness        Poppy Reed is a 61 y.o. old female who presents to the emergency department by private vehicle, after a fall that occurred 3 hours ago while she was walking in [place]. The patient states that she was walking on uneven pavement and slipped and fell hitting her face. The patient had trauma to her upper lip as well as her front left tooth which was already operated on by a maxillary surgeon. Patient denies any loss of consciousness, headache, or blurred vision. Patient also denies any previous facial injury. Since onset the symptoms have been stable. Her pain is minimal as she is still feeling the effects of the Novocain that she received from the surgery earlier this morning. Patient states that she has not taken any medication for her pain since there has been no pain. There has been no confusion, diaphoresis, fever, nasal congestion, headache, chest pain, palpitations, vertigo, dizziness, blurred vision, diplopia, loss of vision, slurred speech, focal weakness, focal sensory loss, clumsiness, nausea, vomiting, neck pain, incontinence or seizure activity. She takes ASA. .  ROS   Pertinent positives and negatives are stated within HPI, all other systems reviewed and are negative. Past Medical History:  has a past medical history of Bladder incontinence, Hyperlipidemia, and Hypertension. Surgical History:  has a past surgical history that includes laparoscopy; LEEP; Finger surgery (Right); and Colonoscopy (2015).     Social History: reports that she has never smoked. She has never used smokeless tobacco. She reports that she does not drink alcohol and does not use drugs. Family History: family history includes Allergy (Severe) in her mother; Alzheimer's Disease in her mother; Cancer in her paternal grandfather; Diabetes in her paternal grandmother; High Cholesterol in her mother; Hypertension in her mother; Osteoporosis in her mother. Allergies: Patient has no known allergies. Physical Exam   Oxygen Saturation Interpretation: Normal.        ED Triage Vitals   BP Temp Temp src Pulse Resp SpO2 Height Weight   08/22/21 1721 08/22/21 1654 -- 08/22/21 1654 08/22/21 1654 08/22/21 1654 -- --   114/88 98 °F (36.7 °C)  70 16 94 %           Constitutional:  Alert. Development consistent with age. Head:  Atraumatic, without temporal or scalp tenderness. Eyes:  PERRL, EOMI. No periorbital ecchymoses. Conjunctiva: normal.  Ears:  External ears without lesions. Face:        Periorbital: Swelling is noted on the upper lip and vermilion border. Zygoma:  Bilateral no swelling, tendeness or deformity. Maxilla:  Bilateral swelling noted. Mandible:  Bilateral no swelling, tendeness or deformity. Facial Skin: Multiple lacerations are noted on the frenulum, one which extends to the tip of the nose. Another laceration appears to be through the tip of the vermillion border but does not go through and through. There is slight bleeding from the laceration, but the bleeding is controlled as the patient is been holding gauze to the laceration since it occurred. Sinuses:  no bilateral maxillary sinus tenderness. no bilateral frontal sinus tenderness. Nose:  There is no deformity present. Skin: The skin of the nose has not been interrupted. One of the lacerations extends to the tip of the nose but does not go through the skin of the nose. Intranasal:   Blood: no.       Abrasion: no.        Laceration: no. Septal hematoma: no.       Septal deviation: no.  Throat:  Pharynx without lesions. Airway patient. Neck:  Supple. Nontender. Chest:  Symmetrical without visible rash or tenderness. Respiratory:  Clear to auscultation and breath sounds equal.  CV:  Regular rate and rhythm, normal heart sounds, without pathological murmurs. GI: Abdomen Soft, nontender. No abrasions, ecchymoses, or lacerations. Back:  No costovertebral, paravertebral, intervertebral, or vertebral tenderness or spasm. Pelvis: non tender and stable to palpation. Integument:  No abrasions, ecchymoses, or lacerations unless noted elsewhere. Musculoskeletal:  No tenderness or swelling. Normal, painless range of motion. No neurovascular deficit. Lymphatic: no lymphadenopathy noted  Neurological:  Orientation age-appropriate. Motor functions intact. Lab / Imaging Results   (All laboratory and radiology results have been personally reviewed by myself)  Labs:  No results found for this visit on 08/22/21. Imaging: All Radiology results interpreted by Radiologist unless otherwise noted. CT FACIAL BONES WO CONTRAST   Final Result   No acute traumatic injury of the facial bones. CT HEAD WO CONTRAST   Final Result   No acute intracranial abnormality. There is age-appropriate atrophy and small-vessel ischemic disease. CT maxillofacial area. There is no acute fracture or dislocation in the maxillofacial area. The   paranasal sinuses and eye globes are intact. No soft tissue abnormalities   are identified. Impression      No acute fracture or dislocation in the maxillofacial area.            ED Course / Medical Decision Making     Medications   lidocaine-EPINEPHrine 1 %-1:[id number] injection 20 mL (has no administration in time range)     ED Course as of Aug 22 2059   Sun Aug 22, 2021   1737 Spoke to Dr. Uvaldo HOLGUIN and  He asked that since the general surgery residents are trained by plastics that they please be paged to place absorbable sutures in the area    [AM]   [facility] Surgical resident returned call and will gladly come and fix patient's face.    [AM]   [facility] Patient and family informed that surgical resident but will be doing laceration repair    [AM]   9809 Dr. Hackett Gather at bedside to fix multiple lacerations    [AM]      ED Course User Index  [AM] Donna Duckworth PA-C     Re-examination:  8/22/21       Time: 18:12  Patient has been roomed in and is awaiting scan. Patient is comfortably sitting with her . Laceration table has been set up for surgical resident. They have agreed to the plan and management    Consult(s):   IP CONSULT TO PLASTIC SURGERY  IP CONSULT TO GENERAL SURGERY    Procedure(s):  Laceration repair to be completed by general surgery resident. Dr. Lamont Deleon took the call. MDM:   Patient is a 63-year-old female presenting today for a laceration on the upper lip after falling while walking in [place]. The patient hit her lip as well as her tooth during the fall. Her tooth was already operated on by an oral surgeon quickly after the incident which took place approximately 5 hours ago. The patient and her  requested that plastic surgery be contacted. Plastic surgery was called and requested that the surgical resident repair the laceration. Surgical resident was called and the patient agreed to be treated by the resident. Laceration was repaired by the surgical resident, please see their note. At this time case was signed out to nurse practitioner pending CT scans. 2100-patient and  both made aware of negative CAT scan results. No evidence of fracture, bleeding, mass or abnormality. Advised to follow-up with primary care physician as well as plastic surgery for suture removal and wound check to the facial lacerations.     Plan of Care/Counseling:  Donna Duckworth PA-C reviewed today's visit with the patient and spouse / life partner in addition to providing specific details for the plan of care and counseling regarding the diagnosis and prognosis. Questions are answered at this time and are agreeable with the plan. Assessment      1. Facial laceration, initial encounter    2. Fall, initial encounter    3. Contusion of face, initial encounter      Plan   Discharged home. Patient condition is stable    New Medications     New Prescriptions    No medications on file     Electronically signed by RITU Brooks CNP   DD: 8/22/21  **This report was transcribed using voice recognition software. Every effort was made to ensure accuracy; however, inadvertent computerized transcription errors may be present.   END OF ED PROVIDER NOTE          RITU Hernández CNP  08/22/21 2100

## 2021-08-22 NOTE — ED NOTES
FIRST PROVIDER CONTACT ASSESSMENT NOTE                                                                                                Department of Emergency Medicine                                                      First Provider Note  21  5:21 PM EDT  NAME: Poppy Reed  : 1955  MRN: [id number]    Chief Complaint: No chief complaint on file. History of Present Illness:   Poppy Reed is a 61 y.o. female who presents to the ED for mechanical fall that occurred a 4 hrs ago in [place].  states that they were walking on [address] on the ground there was a disturbance and she fell onto her left knee and her face into the ground. She states that her son works for a oral surgeon and she went there and had her tooth placed back in. Focused Physical Exam:  VS:    ED Triage Vitals [21 1654]   BP Temp Temp src Pulse Resp SpO2 Height Weight   -- 98 °F (36.7 °C) -- 70 16 94 % -- --        General: Alert and in no apparent distress. Medical History:  has a past medical history of Bladder incontinence, Hyperlipidemia, and Hypertension. Surgical History:  has a past surgical history that includes laparoscopy; LEEP; Finger surgery (Right); and Colonoscopy (2015). Social History:  reports that she has never smoked. She has never used smokeless tobacco. She reports that she does not drink alcohol and does not use drugs. Family History: family history includes Allergy (Severe) in her mother; Alzheimer's Disease in her mother; Cancer in her paternal grandfather; Diabetes in her paternal grandmother; High Cholesterol in her mother; Hypertension in her mother; Osteoporosis in her mother. Allergies: Patient has no known allergies.      Initial Plan of Care:  Initiate Treatment-Testing, Proceed toTreatment Area When Bed Available for ED Attending/MLP to Continue Care    -------------------------------------------------END OF FIRST PROVIDER CONTACT ASSESSMENT NOTE--------------------------------------------------------  Electronically signed by Chandler Gonzalez PA-C   DD: 8/22/21       Chandler Gonzalez PA-C  08/22/21 172

## 2021-08-22 NOTE — CONSULTS
GENERAL SURGERY  CONSULT NOTE  8/22/2021    Physician Consulted: Dr. Suly Madrigal  Reason for Consult: Philtrum laceration  Referring Physician: Dr. Misbah CANTU  George Carroll is a 61 y.o. female who presents for evaluation of philtrum laceration. She had a mechanical fall earlier this afternoon, 8/22, while walking in [place]. She tripped while walking on uneven pavement and stuck her face as she fell. In addition to her laceration, she had injury to her upper left medial incision, which was already treated by her son, who is an [facility] surgeon. She has not experienced any dizziness, nausea, vomiting, fevers, chills, tremor, confusion, abdominal pain, changes to her bowel habits, shortness of breath, or chest pain. She takes aspirin daily but is not on any other anticoagulant or antiplatelet medication. She does not take any steroids. No other relevant medical history       Past Medical History:   Diagnosis Date    Bladder incontinence     Hyperlipidemia     Hypertension        Past Surgical History:   Procedure Laterality Date    COLONOSCOPY  04/30/2015    FINGER SURGERY Right     bone graft middle finger    LAPAROSCOPY      1995    LEEP      2000       Medications Prior to Admission:    Prior to Admission medications    Medication Sig Start Date End Date Taking? Authorizing Provider   triamterene-hydrochlorothiazide (MAXZIDE) 75-50 MG per tablet Take 1 tablet by mouth daily    Historical Provider, MD   vitamin E 400 UNIT capsule Take 400 Units by mouth daily    Historical Provider, MD   B Complex Vitamins (VITAMIN B COMPLEX PO) Take 1 tablet by mouth daily    Historical Provider, MD   Probiotic Product (PROBIOTIC DAILY PO) Take 1 tablet by mouth daily    Historical Provider, MD   potassium chloride SA (K-DUR;KLOR-CON M) 10 MEQ tablet Take 10 mEq by mouth daily.     Historical Provider, MD   aspirin 81 MG tablet   Take 81 mg by mouth daily Pt choice    Historical Provider, MD   Multiple Vitamins-Minerals (THERAPEUTIC MULTIVITAMIN-MINERALS) tablet Take 1 tablet by mouth daily. Historical Provider, MD   Cholecalciferol (VITAMIN D) 2000 UNITS CAPS capsule Take 2,000 Units by mouth. Historical Provider, MD   metoprolol (TOPROL-XL) 25 MG XL tablet   Take 50 mg by mouth daily     Historical Provider, MD       No Known Allergies    Family History   Problem Relation Age of Onset    Allergy (Severe) Mother     Alzheimer's Disease Mother     High Cholesterol Mother     Hypertension Mother     Osteoporosis Mother     Cancer Paternal Grandfather         brain ca    Diabetes Paternal Grandmother        Social History     Tobacco Use    Smoking status: Never Smoker    Smokeless tobacco: Never Used   Substance Use Topics    Alcohol use: No    Drug use: No         Review of Systems   Negative except as listed in the HPI      PHYSICAL EXAM:    Vitals:    08/22/21 1721   BP: 114/88   Pulse:    Resp: 16   Temp:    SpO2:        General Appearance:  awake, alert, oriented,   Skin:  Skin color, texture, turgor normal. No rashes or lesions except as listed for face. Head/face:  1cm jagged laceration present to philtrum. No hematoma or active bleeding  Eyes:  No gross abnormalities. , PERRL, EOMI and Sclera nonicteric  Lungs/Chest:  Normal expansion. No chest wall tenderness  Heart:  Heart regular rate. No chest pain  Abdomen:  Soft, non-tender, non-distended. No organomegaly or masses. Extremities: Extremities warm to touch, pink, with no edema. Rectal:  Rectal exam deferred. LABS:    CBC  No results for input(s): WBC, HGB, HCT, PLT in the last 72 hours. BMP  No results for input(s): NA, K, CL, CO2, BUN, CREATININE, CALCIUM in the last 72 hours. Invalid input(s): GLU  Liver Function  No results for input(s): AMYLASE, LIPASE, BILITOT, BILIDIR, AST, ALT, ALKPHOS, PROT, LABALBU in the last 72 hours. No results for input(s): LACTATE in the last 72 hours.   No results for input(s): INR, PTT in the last 72 hours.    Invalid input(s): PT    RADIOLOGY    No results found. ASSESSMENT:  61 y.o. female with philtrum laceration following mechanical fall from standing.      PLAN:  - laceration repaired in ED with interrupted 4-0 chromic gut  - dental care per OMFS  - pain control  - follow up as outpatient as needed    Plan discussed with Dr. Bruno Rangel    Electronically signed by Jannie Rojas DO on 8/22/21 at 7:36 PM EDT negative...

## 2021-08-22 NOTE — PROCEDURES
Laceration Repair Procedure Note     Indication: laceration of the philtrum     Procedure: The patient was placed in the appropriate position and anesthesia around the laceration was infiltrated with  1% lidocaine with epinephrine. The area was then cleansed with betadine and draped in a sterile fashion. The laceration was noted to extend through the level of the dermis. The laceration was closed with interrupted 4-0 chromic gut. There were no additional lacerations requiring repair. Flaps were aligned with good approximation.  No foreign body was identified.     Total repaired wound length: 1.5c,.       The patient tolerated the procedure well.     Complications: none    Electronically signed by Negar Palaicos DO on 8/22/2021 at 7:47 PM

## 2021-12-10 ENCOUNTER — HOSPITAL ENCOUNTER (OUTPATIENT)
Dept: GENERAL RADIOLOGY | Age: 66
Discharge: HOME | End: 2021-12-12
Payer: COMMERCIAL

## 2021-12-10 DIAGNOSIS — M85.80 OSTEOPENIA, UNSPECIFIED LOCATION: ICD-10-CM

## 2021-12-10 DIAGNOSIS — Z12.31 ENCOUNTER FOR SCREENING MAMMOGRAM FOR MALIGNANT NEOPLASM OF BREAST: ICD-10-CM

## 2021-12-10 PROCEDURE — 77063 BREAST TOMOSYNTHESIS BI: CPT

## 2021-12-10 PROCEDURE — 77080 DXA BONE DENSITY AXIAL: CPT

## 2022-05-20 ENCOUNTER — OFFICE VISIT (OUTPATIENT)
Dept: ENT CLINIC | Age: 67
End: 2022-05-20
Payer: COMMERCIAL

## 2022-05-20 ENCOUNTER — PROCEDURE VISIT (OUTPATIENT)
Dept: AUDIOLOGY | Age: 67
End: 2022-05-20
Payer: COMMERCIAL

## 2022-05-20 VITALS
DIASTOLIC BLOOD PRESSURE: 79 MMHG | WEIGHT: 145 LBS | SYSTOLIC BLOOD PRESSURE: 125 MMHG | BODY MASS INDEX: 23.3 KG/M2 | HEART RATE: 51 BPM | HEIGHT: 66 IN

## 2022-05-20 DIAGNOSIS — H90.3 SENSORINEURAL HEARING LOSS (SNHL) OF BOTH EARS: Primary | ICD-10-CM

## 2022-05-20 DIAGNOSIS — H93.13 TINNITUS OF BOTH EARS: ICD-10-CM

## 2022-05-20 DIAGNOSIS — H93.12 TINNITUS OF LEFT EAR: ICD-10-CM

## 2022-05-20 PROCEDURE — 99204 OFFICE O/P NEW MOD 45 MIN: CPT | Performed by: OTOLARYNGOLOGY

## 2022-05-20 PROCEDURE — 92567 TYMPANOMETRY: CPT | Performed by: AUDIOLOGIST

## 2022-05-20 PROCEDURE — 1123F ACP DISCUSS/DSCN MKR DOCD: CPT | Performed by: OTOLARYNGOLOGY

## 2022-05-20 PROCEDURE — 92557 COMPREHENSIVE HEARING TEST: CPT | Performed by: AUDIOLOGIST

## 2022-05-20 ASSESSMENT — ENCOUNTER SYMPTOMS
EYES NEGATIVE: 1
ALLERGIC/IMMUNOLOGIC NEGATIVE: 1
RESPIRATORY NEGATIVE: 1

## 2022-05-20 NOTE — PROGRESS NOTES
Subjective:     Patient ID:  Erlinda Arreaga is a 74 y.o. female. HPI:    Tinnitus  Patient presents with tinnitus. Onset of symptoms was gradual 1 year ago ago with gradually worseningcourse since that time. Patient describes the tinnitus as first intermittent and now constant locatedin the left ear. The quality is described as high pitchthat sounds like hissing. The pattern is nonpulsatile with an intensity that is soft. Patientdescribes her level of annoyance as minimally annoying. Associated symptomsinclude hearing loss Family history is negative family history for tinnitusPatient has had no prior evaluation, treatment or surgery for tinnitus  Previous treatments include none. Patient does not have hearing aids at this time. History of Head trauma: no   Description: none  History of surgeryto the head/neck: no   Description:none  History of cerumen impaction: no  History of noise exposure: no   Type: none  Spinning: no   Length of time:   Hearing loss: yes    Fluctuating: no  Aural pressure: yes  Tinnitus:yes  Otalgia:no    Patient'smedications, allergies, past medical, surgical, social and family histories werereviewed and updated as appropriate. Review of Systems   Constitutional: Negative. HENT: Positive for hearing loss and tinnitus. Eyes: Negative. Respiratory: Negative. Allergic/Immunologic: Negative. Neurological: Negative. Hematological: Negative. Psychiatric/Behavioral: Negative. All other systems reviewed and are negative. Objective:   Physical Exam  Vitals reviewed. Constitutional:       Appearance: Normal appearance. HENT:      Head: Normocephalic. Right Ear: Tympanic membrane, ear canal and external ear normal.      Left Ear: Tympanic membrane, ear canal and external ear normal.      Nose: Nose normal.      Mouth/Throat:      Mouth: Mucous membranes are moist.   Cardiovascular:      Rate and Rhythm: Normal rate. Pulses: Normal pulses. Pulmonary:      Effort: Pulmonary effort is normal.   Musculoskeletal:      Cervical back: Normal range of motion and neck supple. Lymphadenopathy:      Cervical: No cervical adenopathy. Skin:     General: Skin is warm. Capillary Refill: Capillary refill takes less than 2 seconds. Neurological:      Mental Status: She is alert and oriented to person, place, and time. Audiogram   An audiogram was ordered, obtained and reviewed by me, in order to evaluate thehearing loss associated with tinnitus. mild high frequency     Discrimination    Right- 92%    Left - 92%     Tympanogram -    Right - Type A    Pressure - normal    Left   - Type A    Pressure - normal                            Assessment:       Diagnosis Orders   1. Tinnitus of left ear     2. Sensorineural hearing loss (SNHL) of both ears                Plan:      Mild hearing loss, Tinnitus left side   Reassured that the hearing loss is mild and that no further evaluation is indicated at this time. Also discussed the importance of hearing protection from now on to preserve remaining hearing. Discussed masking techniques for left sided tinnitus   Follow up in 1 year(s)        Naima Trevino  1955    I have discussed the case, including pertinent history and exam findings with the resident. I have seen and examined the patient and the key elements of the encounter have been performed by me. I agree with the assessment, plan and orders as documented by the  resident              Remainder of medical problems as per  resident note. Patient seen and examined. Agree with above exam, assessment and plan.       Electronically signed by Krysta Amezquita DO on 5/27/22 at 1:07 PM EDT

## 2022-05-20 NOTE — PROGRESS NOTES
This patient was referred for audiometric/tympanometric testing by Dr. Cam Wilder due to tinnitus, worse in the left ear. She had a hearing screening about 3 months ago that showed slight asymmetry, left ear worse. She did not have results today. Hearing fluctuates, worse at night. .     Audiometry using pure tone air and bone conduction testing revealed borderline normal hearing through 2000 Hz with a mild  sensorineural hearing loss in the higher frequencies, bilaterally. Reliability was good. Speech reception thresholds were in good agreement with the pure tone averages, bilaterally. Speech discrimination scores were excellent, bilaterally. Tympanometry revealed normal middle ear peak pressure and compliance, bilaterally. The results were reviewed with the patient. Recommendations for follow up will be made pending physician consult.     Russel Barrientos

## 2022-07-27 LAB
CHOLESTEROL, TOTAL: 176 MG/DL (ref 0–199)
GLUCOSE BLD-MCNC: 99 MG/DL (ref 74–107)
HDLC SERPL-MCNC: 52 MG/DL
LDL CHOLESTEROL CALCULATED: 101 MG/DL (ref 0–99)
TRIGL SERPL-MCNC: 116 MG/DL (ref 0–149)

## 2022-09-09 ENCOUNTER — PREP FOR PROCEDURE (OUTPATIENT)
Dept: SURGERY | Age: 67
End: 2022-09-09

## 2022-09-09 RX ORDER — SODIUM CHLORIDE 9 MG/ML
INJECTION, SOLUTION INTRAVENOUS CONTINUOUS
Status: CANCELLED | OUTPATIENT
Start: 2022-09-09

## 2022-09-09 NOTE — H&P (VIEW-ONLY)
Name: Alicia Mcclendon                  : 1955 Sex: F  Age: 74 yrs  Acct#:  [id number]            CC: , Diarrhea    HPI: [The patient is a 62-year-old white female who presents with one year of loose stools. The patient also complains of urgency. The patient denies melena and hematochezia. The patient does have burning bowel movements. Also, the patient has reflux disease. The patient denies any other significant associated symptomatology. The patient denies anorexia and weight loss. The patient denies abdominal pain. The patient denies a family history for colorectal cancer. The patient denies antibiotic use and sick contacts.]    Meds Prior to Visit:  Triamterene/Hydrochlorothiazide     Metoprolol Succinate ER     Rosuvastatin Calcium     Escitalopram Oxalate     Potassium Chloride ER        Allergies:      PMH:  Problem List: Diarrhea, Gastroesophageal reflux disease  Health Maintenance:  Mammogram - (2021)  Colonoscopy - ()  Surgical Hx:  Colonoscopy - ()  [place] Tooth Extraction - ()  Reviewed and updated. FH:  Father:   - ()  Multiple Sclerosis (MS) - () when diagnosed  Paralysis. Mother:  Alzheimer's Disease - ()  Arthritis  Lung Cancer - ()  Cataract  Cholecystectomy - ()   - ()  Dementia  Diabetes - ()  Hypertension  Osteoporosis.  due to Lung Cancer - (2018). Reviewed, no changes. SH:  Marital: Legal Status: . Work Status: Full-Time Employment. Personal Habits:  Tobacco Use: Patient has never smoked. Alcohol: Current Alcohol Use; Social.Drug Use: Never Used Drugs. Daily Caffeine: Uses Caffeine. Reviewed, no changes. Date: 2022  Was the patient queried about smoking behavior? Yes  No  Does the patient currently smoke? Tobacco Use: Patient has never smoked. ROS:  Const: Denies anorexia, anxiety, fatigue, night sweats, weight gain and weight loss. Eyes: Denies eye symptoms. ENMT: Denies ear symptoms.  Denies nasal symptoms. Denies mouth or throat symptoms. CV: Denies hypertension and other cardiovascular symptoms. Resp: Denies respiratory symptoms. GI: Denies hepatitis, liver disease and other gastrointestinal symptoms. Musculo: Denies musculoskeletal symptoms. Skin: Denies skin, hair and nail symptoms. Breast: Denies breast problems. Neuro: Denies neurologic symptoms. Psych: Denies depression and substance abuse. Endocrine: Denies diabetes, kidney disease and thyroid disease. Hema/Lymph: Denies anemia, blood disease, cancer and past transfusion. Allergy/Immuno: Denies immunosuppression. Reviewed, no changes. Ht: 66\" 5'6\" Wt: 145lb BMI: 23.4    Exam:    Lungs are clear to auscultation  Cardiac regular rate and rhythm without murmurs, rubs or gallops  Abdomen is soft, nondistended and nontender  Extremities without clubbing, cyanosis or edema         Assessment #1: Hx R19.7 Diarrhea, unspecified   Care Plan:              Comments       : The patient will undergo an EGD and colonoscopy. I have informed her of the risks, benefits and complications of the procedure and she is willing to proceed. Moderate complexity    I performed an updated history, physical examination, assessment and plan.      Assessment #2: Hx K21.9 Gastro-esophageal reflux disease without esophagitis   Care Plan:                        Seen by:

## 2022-09-09 NOTE — H&P
Name: Miguel Lazaro                  : 1955 Sex: F  Age: 74 yrs  Acct#:  [id number]            CC: , Diarrhea    HPI: [The patient is a 51-year-old white female who presents with one year of loose stools. The patient also complains of urgency. The patient denies melena and hematochezia. The patient does have burning bowel movements. Also, the patient has reflux disease. The patient denies any other significant associated symptomatology. The patient denies anorexia and weight loss. The patient denies abdominal pain. The patient denies a family history for colorectal cancer. The patient denies antibiotic use and sick contacts.]    Meds Prior to Visit:  Triamterene/Hydrochlorothiazide     Metoprolol Succinate ER     Rosuvastatin Calcium     Escitalopram Oxalate     Potassium Chloride ER        Allergies:      PMH:  Problem List: Diarrhea, Gastroesophageal reflux disease  Health Maintenance:  Mammogram - (2021)  Colonoscopy - ()  Surgical Hx:  Colonoscopy - ()  Champion Tooth Extraction - ()  Reviewed and updated. FH:  Father:   - ()  Multiple Sclerosis (MS) - () when diagnosed  Paralysis. Mother:  Alzheimer's Disease - ()  Arthritis  Lung Cancer - ()  Cataract  Cholecystectomy - ()   - ()  Dementia  Diabetes - ()  Hypertension  Osteoporosis.  due to Lung Cancer - (2018). Reviewed, no changes. SH:  Marital: Legal Status: . Work Status: Full-Time Employment. Personal Habits:  Tobacco Use: Patient has never smoked. Alcohol: Current Alcohol Use; Social.Drug Use: Never Used Drugs. Daily Caffeine: Uses Caffeine. Reviewed, no changes. Date: 2022  Was the patient queried about smoking behavior? Yes  No  Does the patient currently smoke? Tobacco Use: Patient has never smoked. ROS:  Const: Denies anorexia, anxiety, fatigue, night sweats, weight gain and weight loss. Eyes: Denies eye symptoms. ENMT: Denies ear symptoms.  Denies nasal symptoms. Denies mouth or throat symptoms. CV: Denies hypertension and other cardiovascular symptoms. Resp: Denies respiratory symptoms. GI: Denies hepatitis, liver disease and other gastrointestinal symptoms. Musculo: Denies musculoskeletal symptoms. Skin: Denies skin, hair and nail symptoms. Breast: Denies breast problems. Neuro: Denies neurologic symptoms. Psych: Denies depression and substance abuse. Endocrine: Denies diabetes, kidney disease and thyroid disease. Hema/Lymph: Denies anemia, blood disease, cancer and past transfusion. Allergy/Immuno: Denies immunosuppression. Reviewed, no changes. Ht: 66\" 5'6\" Wt: 145lb BMI: 23.4    Exam:    Lungs are clear to auscultation  Cardiac regular rate and rhythm without murmurs, rubs or gallops  Abdomen is soft, nondistended and nontender  Extremities without clubbing, cyanosis or edema         Assessment #1: Hx R19.7 Diarrhea, unspecified   Care Plan:              Comments       : The patient will undergo an EGD and colonoscopy. I have informed her of the risks, benefits and complications of the procedure and she is willing to proceed. Moderate complexity    I performed an updated history, physical examination, assessment and plan.      Assessment #2: Hx K21.9 Gastro-esophageal reflux disease without esophagitis   Care Plan:                        Seen by:

## 2022-09-20 NOTE — PROGRESS NOTES
[facility] PRE-ADMISSION TESTING INSTRUCTIONS      ARRIVAL INSTRUCTIONS:  [x] Parking the day of Surgery is located in the Main Entrance lot. Upon entering the main door make an immediate right to the surgery reception desk. [x] Bring photo ID and insurance card    [] Bring in a copy of Living will or Durable Power of  papers. [x] Please be sure to arrange for responsible adult to provide transportation to and from the hospital    [x] Please arrange for responsible adult to be with you for the 24 hour period post procedure due to having anesthesia    [x] If you awake am of surgery not feeling well or have temperature >100 please call [phone]    GENERAL INSTRUCTIONS:    [x] Nothing by mouth after midnight, including gum, candy, mints or water    [x] You may brush your teeth, but do not swallow any water    [x] Take medications as instructed with 1-2 oz of water    [x] Stop herbal supplements and vitamins 5 days prior to procedure    [x] Follow preop dosing of blood thinners per physician instructions    [] Take 1/2 dose of evening insulin, but no insulin after midnight    [] No oral diabetic medications after midnight    [] If diabetic and have low blood sugar or feel symptomatic, take 1-2oz apple juice only    [] Bring inhalers day of surgery    [] Bring C-PAP/ Bi-Pap day of surgery    [] Bring urine specimen day of surgery    [x] Shower or bath with soap, lather and rinse well, AM of Surgery, no lotion, powders or creams to surgical site    [] Follow bowel prep as instructed per surgeon    [x] No tobacco products within 24 hours of surgery     [x] No alcohol or illegal drug use within 24 hours of surgery.     [x] Jewelry, body piercing's, eyeglasses, contact lenses and dentures are not permitted into surgery (bring cases)      [x] Please do not wear any nail polish, make up or hair products on the day of surgery    [x] You can expect a call the business day prior to procedure to notify you if your arrival time changes    [x] If you receive a survey after surgery we would greatly appreciate your comments    [x] Please notify surgeon if you develop any illness between now and time of surgery (cold, cough, sore throat, fever, nausea, vomiting) or any signs of infections  including skin, wounds, and dental.    []  The Outpatient Pharmacy is available to fill your prescription here on your day of surgery, ask your preop nurse for details

## 2022-09-22 ENCOUNTER — ANESTHESIA EVENT (OUTPATIENT)
Dept: ENDOSCOPY | Age: 67
End: 2022-09-22
Payer: COMMERCIAL

## 2022-09-22 ASSESSMENT — LIFESTYLE VARIABLES: SMOKING_STATUS: 0

## 2022-09-22 NOTE — ANESTHESIA PRE PROCEDURE
Department of Anesthesiology  Preprocedure Note       Name:  Maximiliano Stallworth   Age:  74 y.o.  :  1955                                          MRN:  [id number]         Date:  2022      Surgeon: Kevin Jackson):  Hermann Vincent MD    Procedure: Procedure(s):  EGD ESOPHAGOGASTRODUODENOSCOPY  COLONOSCOPY DIAGNOSTIC    Medications prior to admission:   Prior to Admission medications    Medication Sig Start Date End Date Taking? Authorizing Provider   Omega-3 Fatty Acids (OMEGA-3 FISH OIL PO) Take by mouth daily   Yes Historical Provider, MD   triamterene-hydrochlorothiazide (MAXZIDE) 75-50 MG per tablet Take 1 tablet by mouth daily    Historical Provider, MD   Probiotic Product (PROBIOTIC DAILY PO) Take 1 tablet by mouth daily    Historical Provider, MD   potassium chloride SA (K-DUR;KLOR-CON M) 10 MEQ tablet Take 10 mEq by mouth daily. Historical Provider, MD   aspirin 81 MG tablet   Take 81 mg by mouth daily Pt choice    Historical Provider, MD   Multiple Vitamins-Minerals (THERAPEUTIC MULTIVITAMIN-MINERALS) tablet Take 1 tablet by mouth daily. Historical Provider, MD   Cholecalciferol (VITAMIN D) 2000 UNITS CAPS capsule Take 2,000 Units by mouth. Historical Provider, MD   metoprolol (TOPROL-XL) 25 MG XL tablet   Take 50 mg by mouth daily     Historical Provider, MD       Current medications:    No current facility-administered medications for this encounter. Current Outpatient Medications   Medication Sig Dispense Refill    Omega-3 Fatty Acids (OMEGA-3 FISH OIL PO) Take by mouth daily      triamterene-hydrochlorothiazide (MAXZIDE) 75-50 MG per tablet Take 1 tablet by mouth daily      Probiotic Product (PROBIOTIC DAILY PO) Take 1 tablet by mouth daily      potassium chloride SA (K-DUR;KLOR-CON M) 10 MEQ tablet Take 10 mEq by mouth daily.       aspirin 81 MG tablet   Take 81 mg by mouth daily Pt choice      Multiple Vitamins-Minerals (THERAPEUTIC MULTIVITAMIN-MINERALS) tablet Take 1 tablet by 07:23 AM    GLUCOSE 99 07/27/2022 07:37 AM    PROT 7.0 03/30/2021 07:23 AM    CALCIUM 9.1 03/30/2021 07:23 AM    BILITOT 0.4 03/30/2021 07:23 AM    ALKPHOS 43 03/30/2021 07:23 AM    AST 28 03/30/2021 07:23 AM    ALT 27 03/30/2021 07:23 AM       POC Tests: No results for input(s): POCGLU, POCNA, POCK, POCCL, POCBUN, POCHEMO, POCHCT in the last 72 hours. Coags: No results found for: PROTIME, INR, APTT    HCG (If Applicable): No results found for: PREGTESTUR, PREGSERUM, HCG, HCGQUANT     ABGs: No results found for: PHART, PO2ART, WKC1HRI, YAI7EXW, BEART, W1IMXOSH     Type & Screen (If Applicable):  No results found for: LABABO, LABRH    Drug/Infectious Status (If Applicable):  No results found for: HIV, HEPCAB    COVID-19 Screening (If Applicable): No results found for: COVID19        Anesthesia Evaluation  Patient summary reviewed no history of anesthetic complications:   Airway: Mallampati: II  TM distance: >3 FB   Neck ROM: full  Mouth opening: > = 3 FB   Dental: normal exam         Pulmonary:Negative Pulmonary ROS breath sounds clear to auscultation      (-) not a current smoker                           Cardiovascular:    (+) hypertension:, hyperlipidemia        Rhythm: regular  Rate: normal           Beta Blocker:  Not on Beta Blocker         Neuro/Psych:   Negative Neuro/Psych ROS              GI/Hepatic/Renal:   (+) bowel prep,          ROS comment: Diarrhea. Endo/Other: Negative Endo/Other ROS                    Abdominal:             Vascular: negative vascular ROS. Other Findings:           Anesthesia Plan      MAC     ASA 2       Induction: intravenous. Anesthetic plan and risks discussed with patient. Plan discussed with CRNA. Pt seen, examined, chart reviewed, plan discussed. Daphne Patricia MD  9/23/2022  6:46 AM    Chart reviewed . Patient assessed before induction. I agree with the above note.   RITU Cervantes - CRNA

## 2022-09-23 ENCOUNTER — HOSPITAL ENCOUNTER (OUTPATIENT)
Age: 67
Setting detail: OUTPATIENT SURGERY
Discharge: HOME | End: 2022-09-23
Attending: SURGERY | Admitting: SURGERY
Payer: COMMERCIAL

## 2022-09-23 ENCOUNTER — ANESTHESIA (OUTPATIENT)
Dept: ENDOSCOPY | Age: 67
End: 2022-09-23
Payer: COMMERCIAL

## 2022-09-23 VITALS
WEIGHT: 145 LBS | HEIGHT: 66 IN | RESPIRATION RATE: 16 BRPM | HEART RATE: 50 BPM | SYSTOLIC BLOOD PRESSURE: 122 MMHG | OXYGEN SATURATION: 97 % | DIASTOLIC BLOOD PRESSURE: 65 MMHG | TEMPERATURE: 96.8 F | BODY MASS INDEX: 23.3 KG/M2

## 2022-09-23 DIAGNOSIS — R19.7 DIARRHEA, UNSPECIFIED TYPE: ICD-10-CM

## 2022-09-23 PROCEDURE — 87329 GIARDIA AG IA: CPT

## 2022-09-23 PROCEDURE — 89055 LEUKOCYTE ASSESSMENT FECAL: CPT

## 2022-09-23 PROCEDURE — 3609012400 HC EGD TRANSORAL BIOPSY SINGLE/MULTIPLE: Performed by: SURGERY

## 2022-09-23 PROCEDURE — 88305 TISSUE EXAM BY PATHOLOGIST: CPT

## 2022-09-23 PROCEDURE — 7100000010 HC PHASE II RECOVERY - FIRST 15 MIN: Performed by: SURGERY

## 2022-09-23 PROCEDURE — 87324 CLOSTRIDIUM AG IA: CPT

## 2022-09-23 PROCEDURE — 2709999900 HC NON-CHARGEABLE SUPPLY: Performed by: SURGERY

## 2022-09-23 PROCEDURE — 7100000011 HC PHASE II RECOVERY - ADDTL 15 MIN: Performed by: SURGERY

## 2022-09-23 PROCEDURE — 88342 IMHCHEM/IMCYTCHM 1ST ANTB: CPT

## 2022-09-23 PROCEDURE — 6360000002 HC RX W HCPCS: Performed by: NURSE ANESTHETIST, CERTIFIED REGISTERED

## 2022-09-23 PROCEDURE — 87449 NOS EACH ORGANISM AG IA: CPT

## 2022-09-23 PROCEDURE — 3700000001 HC ADD 15 MINUTES (ANESTHESIA): Performed by: SURGERY

## 2022-09-23 PROCEDURE — 3700000000 HC ANESTHESIA ATTENDED CARE: Performed by: SURGERY

## 2022-09-23 PROCEDURE — 2580000003 HC RX 258: Performed by: NURSE ANESTHETIST, CERTIFIED REGISTERED

## 2022-09-23 PROCEDURE — 82705 FATS/LIPIDS FECES QUAL: CPT

## 2022-09-23 PROCEDURE — 87045 FECES CULTURE AEROBIC BACT: CPT

## 2022-09-23 PROCEDURE — 3609010300 HC COLONOSCOPY W/BIOPSY SINGLE/MULTIPLE: Performed by: SURGERY

## 2022-09-23 RX ORDER — SODIUM CHLORIDE 9 MG/ML
INJECTION, SOLUTION INTRAVENOUS CONTINUOUS PRN
Status: DISCONTINUED | OUTPATIENT
Start: 2022-09-23 | End: 2022-09-23 | Stop reason: SDUPTHER

## 2022-09-23 RX ORDER — SODIUM CHLORIDE 9 MG/ML
INJECTION, SOLUTION INTRAVENOUS CONTINUOUS
Status: DISCONTINUED | OUTPATIENT
Start: 2022-09-23 | End: 2022-09-23 | Stop reason: HOSPADM

## 2022-09-23 RX ORDER — PROPOFOL 10 MG/ML
INJECTION, EMULSION INTRAVENOUS PRN
Status: DISCONTINUED | OUTPATIENT
Start: 2022-09-23 | End: 2022-09-23 | Stop reason: SDUPTHER

## 2022-09-23 RX ADMIN — PROPOFOL 360 MG: 10 INJECTION, EMULSION INTRAVENOUS at 07:55

## 2022-09-23 RX ADMIN — SODIUM CHLORIDE: 9 INJECTION, SOLUTION INTRAVENOUS at 07:26

## 2022-09-23 RX ADMIN — SODIUM CHLORIDE: 9 INJECTION, SOLUTION INTRAVENOUS at 07:28

## 2022-09-23 ASSESSMENT — PAIN SCALES - GENERAL
PAINLEVEL_OUTOF10: 0

## 2022-09-23 ASSESSMENT — PAIN DESCRIPTION - LOCATION
LOCATION: ABDOMEN

## 2022-09-23 ASSESSMENT — PAIN - FUNCTIONAL ASSESSMENT: PAIN_FUNCTIONAL_ASSESSMENT: NONE - DENIES PAIN

## 2022-09-23 NOTE — ANESTHESIA POSTPROCEDURE EVALUATION
Department of Anesthesiology  Postprocedure Note    Patient: Brenda Phillips  MRN: [id number]  YOB: 1955  Date of evaluation: 9/23/2022      Procedure Summary     Date: 09/23/22 Room / Location: [facility] / [facility]    Anesthesia Start: 5187 Anesthesia Stop: 7188    Procedures:       EGD BIOPSY      COLONOSCOPY WITH BIOPSY Diagnosis:       Diarrhea, unspecified type      (Diarrhea, unspecified type [R19.7])    Surgeons: Edelmira Avila MD Responsible Provider: Jr Coats MD    Anesthesia Type: MAC ASA Status: 2          Anesthesia Type: MAC    Justin Phase I: Justin Score: 10    Justin Phase II:        Anesthesia Post Evaluation    Patient location during evaluation: PACU  Patient participation: complete - patient participated  Level of consciousness: awake  Airway patency: patent  Nausea & Vomiting: no nausea and no vomiting  Complications: no  Cardiovascular status: hemodynamically stable  Respiratory status: acceptable  Hydration status: euvolemic

## 2022-09-23 NOTE — PROGRESS NOTES
2428 admitted to pacu stage 2  family member here at bedside   0830 Dr Josee Cordova here and spike to pt and then discharge instructions reviewed with pt and her family member and they verbalized understanding of instructions  0848 pt discharged into the care of her family member

## 2022-09-23 NOTE — OP NOTE
Operative Note      Patient: My Barcenas  YOB: 1955  MRN: [id number]    Date of Procedure: 9/23/2022    Pre-Op Diagnosis: Diarrhea, unspecified type [R19.7]    Post-Op Diagnosis:     Type I hiatal hernia with the diaphragm at 40 cm from incisors and the GE junction at 35 cm with changes consistent with reflux disease  Mild gastritis  Mild duodenitis  Nonspecific colitis       Procedure(s):  EGD ESOPHAGOGASTRODUODENOSCOPY  COLONOSCOPY DIAGNOSTIC    Surgeon(s):  Brandi Major MD    Assistant:   * No surgical staff found *    Anesthesia: Monitor Anesthesia Care    Estimated Blood Loss (mL): Minimal    Complications: None    Specimens:   ID Type Source Tests Collected by Time Destination   1 : STOOL Stool Stool CLOSTRIDIUM DIFFICILE EIA, GIARDIA ANTIGEN, WBC, FECAL FAT, QUALITATIVE, FECAL FAT, QUANTITATIVE, CULTURE, STOOL Brandi Major MD 9/23/2022 3421    A : doudeum biopsy Tissue Duodenum SURGICAL PATHOLOGY Brandi Major MD 9/23/2022 8182    B : antrum  Tissue Stomach SURGICAL PATHOLOGY Brandi Major MD 9/23/2022 0745    C : G-E JUNCTION Tissue Tissue SURGICAL PATHOLOGY Brandi Major MD 9/23/2022 4556    D : [address] 60 Tissue Tissue SURGICAL PATHOLOGY Brandi Major MD 9/23/2022 3427    E : RANDOM COLON Tissue Colon SURGICAL PATHOLOGY Brandi Major MD 9/23/2022 8768        Implants:  * No implants in log *      Drains: * No LDAs found *    Findings: As above    Detailed Description of Procedure: The patient was brought to the endoscopy suite placed on the table left lateral decubitus position. Patient received anesthesia per the department of anesthesiology. A bite-block was placed. The endoscope was passed through the lumen of the esophagus, stomach and duodenum. The endoscope was retrieved. The duodenum was normal.  Biopsies were obtained for celiac disease. Upon reentry into the stomach, the patient a mild gastritis. Biopsies were obtained.   The retroflexed view did reveal a hiatal hernia. The diaphragm was at 40 cm and the GE junction at 35 cm from the incisors. The patient had mild reflux changes. Biopsies were obtained. The remainder of the esophageal examination was uneventful. The patient was then positioned for full colonoscopy. A digital rectal exam was performed. No gross findings were noted. The endoscope was inserted and passed without difficulty through the entire length of the colon. The cecum was photo documented. The ileocecal valve was intubated. The ileum was biopsied. The endoscope was retrieved. Random biopsies of the colon were obtained. The preparation was adequate with no solid debris. No significant pathologic changes were noted. The retroflexed view was uneventful. Assessment    Diarrhea    Plan:    Await pathology and cultures. Otherwise, repeat colonoscopy in 7 years.     Electronically signed by Melissa Hamilton MD on 9/23/2022 at 7:56 AM

## 2022-09-23 NOTE — DISCHARGE INSTRUCTIONS
Resume normal diet today  Resume normal activity tomorrow  Please contact the office with nausea, vomiting, fevers, chills and abdominal pain.

## 2022-09-23 NOTE — INTERVAL H&P NOTE
Update History & Physical    The patient's History and Physical of September 23, 2022 was reviewed with the patient and I examined the patient. There was no change. The surgical site was confirmed by the patient and me. Plan: The risks, benefits, expected outcome, and alternative to the recommended procedure have been discussed with the patient. Patient understands and wants to proceed with the procedure.      Electronically signed by Austen Gonzalez MD on 9/23/2022 at 6:55 AM

## 2022-09-24 LAB
C DIFF TOXIN/ANTIGEN: NORMAL
WHITE BLOOD CELLS (WBC), STOOL: NORMAL

## 2022-09-25 LAB — CULTURE, STOOL: NORMAL

## 2022-09-26 LAB — GIARDIA ANTIGEN STOOL: NORMAL

## 2022-09-29 LAB
FECAL NEUTRAL FAT: NORMAL
FECAL SPLIT FATS: NORMAL

## 2022-10-28 ENCOUNTER — HOSPITAL ENCOUNTER (OUTPATIENT)
Age: 67
Discharge: HOME | End: 2022-10-28

## 2022-11-03 ENCOUNTER — HOSPITAL ENCOUNTER (OUTPATIENT)
Age: 67
Discharge: HOME | End: 2022-11-03
Payer: COMMERCIAL

## 2022-11-03 LAB
ALBUMIN SERPL-MCNC: 4.2 G/DL (ref 3.5–5.2)
ALP BLD-CCNC: 41 U/L (ref 35–104)
ALT SERPL-CCNC: 22 U/L (ref 0–32)
ANION GAP SERPL CALCULATED.3IONS-SCNC: 12 MMOL/L (ref 7–16)
AST SERPL-CCNC: 25 U/L (ref 0–31)
BASOPHILS ABSOLUTE: 0.09 E9/L (ref 0–0.2)
BASOPHILS RELATIVE PERCENT: 1.9 % (ref 0–2)
BILIRUB SERPL-MCNC: 0.5 MG/DL (ref 0–1.2)
BUN BLDV-MCNC: 21 MG/DL (ref 6–23)
CALCIUM SERPL-MCNC: 9.4 MG/DL (ref 8.6–10.2)
CHLORIDE BLD-SCNC: 102 MMOL/L (ref 98–107)
CO2: 29 MMOL/L (ref 22–29)
CREAT SERPL-MCNC: 1 MG/DL (ref 0.5–1)
EOSINOPHILS ABSOLUTE: 0.15 E9/L (ref 0.05–0.5)
EOSINOPHILS RELATIVE PERCENT: 3.2 % (ref 0–6)
GFR SERPL CREATININE-BSD FRML MDRD: >60 ML/MIN/1.73
GLUCOSE BLD-MCNC: 96 MG/DL (ref 74–99)
HCT VFR BLD CALC: 42.9 % (ref 34–48)
HEMOGLOBIN: 14.8 G/DL (ref 11.5–15.5)
IMMATURE GRANULOCYTES #: 0.01 E9/L
IMMATURE GRANULOCYTES %: 0.2 % (ref 0–5)
LYMPHOCYTES ABSOLUTE: 1.51 E9/L (ref 1.5–4)
LYMPHOCYTES RELATIVE PERCENT: 31.9 % (ref 20–42)
MCH RBC QN AUTO: 32.3 PG (ref 26–35)
MCHC RBC AUTO-ENTMCNC: 34.5 % (ref 32–34.5)
MCV RBC AUTO: 93.7 FL (ref 80–99.9)
MONOCYTES ABSOLUTE: 0.43 E9/L (ref 0.1–0.95)
MONOCYTES RELATIVE PERCENT: 9.1 % (ref 2–12)
NEUTROPHILS ABSOLUTE: 2.54 E9/L (ref 1.8–7.3)
NEUTROPHILS RELATIVE PERCENT: 53.7 % (ref 43–80)
PDW BLD-RTO: 11.9 FL (ref 11.5–15)
PLATELET # BLD: 225 E9/L (ref 130–450)
PMV BLD AUTO: 11 FL (ref 7–12)
POTASSIUM SERPL-SCNC: 3.5 MMOL/L (ref 3.5–5)
RBC # BLD: 4.58 E12/L (ref 3.5–5.5)
SODIUM BLD-SCNC: 143 MMOL/L (ref 132–146)
TOTAL PROTEIN: 7 G/DL (ref 6.4–8.3)
WBC # BLD: 4.7 E9/L (ref 4.5–11.5)

## 2022-11-03 PROCEDURE — 85025 COMPLETE CBC W/AUTO DIFF WBC: CPT

## 2022-11-03 PROCEDURE — 80053 COMPREHEN METABOLIC PANEL: CPT

## 2022-11-03 PROCEDURE — 36415 COLL VENOUS BLD VENIPUNCTURE: CPT

## 2022-11-10 ENCOUNTER — HOSPITAL ENCOUNTER (OUTPATIENT)
Age: 67
Discharge: HOME | End: 2022-11-10
Payer: COMMERCIAL

## 2022-11-10 LAB — C DIFF TOXIN/ANTIGEN: NORMAL

## 2022-11-10 PROCEDURE — 87324 CLOSTRIDIUM AG IA: CPT

## 2022-11-10 PROCEDURE — 87449 NOS EACH ORGANISM AG IA: CPT

## 2022-12-20 ENCOUNTER — HOSPITAL ENCOUNTER (OUTPATIENT)
Dept: GENERAL RADIOLOGY | Age: 67
Discharge: HOME | End: 2022-12-22
Payer: COMMERCIAL

## 2022-12-20 VITALS — HEIGHT: 66 IN | WEIGHT: 138 LBS | BODY MASS INDEX: 22.18 KG/M2

## 2022-12-20 DIAGNOSIS — Z12.31 VISIT FOR SCREENING MAMMOGRAM: ICD-10-CM

## 2022-12-20 PROCEDURE — 77063 BREAST TOMOSYNTHESIS BI: CPT

## 2023-11-07 ENCOUNTER — HOSPITAL ENCOUNTER (OUTPATIENT)
Age: 68
Discharge: HOME | End: 2023-11-07
Payer: COMMERCIAL

## 2023-11-07 LAB
ALBUMIN SERPL-MCNC: 4.1 G/DL (ref 3.5–5.2)
ALP SERPL-CCNC: 37 U/L (ref 35–104)
ALT SERPL-CCNC: 17 U/L (ref 0–32)
ANION GAP SERPL CALCULATED.3IONS-SCNC: 11 MMOL/L (ref 7–16)
AST SERPL-CCNC: 24 U/L (ref 0–31)
BASOPHILS # BLD: 0.05 K/UL (ref 0–0.2)
BASOPHILS NFR BLD: 1 % (ref 0–2)
BILIRUB SERPL-MCNC: 0.5 MG/DL (ref 0–1.2)
BUN SERPL-MCNC: 16 MG/DL (ref 6–23)
CALCIUM SERPL-MCNC: 9.3 MG/DL (ref 8.6–10.2)
CHLORIDE SERPL-SCNC: 104 MMOL/L (ref 98–107)
CHOLEST SERPL-MCNC: 163 MG/DL
CO2 SERPL-SCNC: 28 MMOL/L (ref 22–29)
CREAT SERPL-MCNC: 1 MG/DL (ref 0.5–1)
EOSINOPHIL # BLD: 0.12 K/UL (ref 0.05–0.5)
EOSINOPHILS RELATIVE PERCENT: 3 % (ref 0–6)
ERYTHROCYTE [DISTWIDTH] IN BLOOD BY AUTOMATED COUNT: 12.1 % (ref 11.5–15)
GFR SERPL CREATININE-BSD FRML MDRD: >60 ML/MIN/1.73M2
GLUCOSE SERPL-MCNC: 94 MG/DL (ref 74–99)
HCT VFR BLD AUTO: 40.8 % (ref 34–48)
HDLC SERPL-MCNC: 51 MG/DL
HGB BLD-MCNC: 13.5 G/DL (ref 11.5–15.5)
IMM GRANULOCYTES # BLD AUTO: <0.03 K/UL (ref 0–0.58)
IMM GRANULOCYTES NFR BLD: 0 % (ref 0–5)
LDLC SERPL CALC-MCNC: 90 MG/DL
LYMPHOCYTES NFR BLD: 1.56 K/UL (ref 1.5–4)
LYMPHOCYTES RELATIVE PERCENT: 32 % (ref 20–42)
MCH RBC QN AUTO: 31.2 PG (ref 26–35)
MCHC RBC AUTO-ENTMCNC: 33.1 G/DL (ref 32–34.5)
MCV RBC AUTO: 94.2 FL (ref 80–99.9)
MONOCYTES NFR BLD: 0.4 K/UL (ref 0.1–0.95)
MONOCYTES NFR BLD: 8 % (ref 2–12)
NEUTROPHILS NFR BLD: 56 % (ref 43–80)
NEUTS SEG NFR BLD: 2.74 K/UL (ref 1.8–7.3)
PLATELET # BLD AUTO: 201 K/UL (ref 130–450)
PMV BLD AUTO: 10.8 FL (ref 7–12)
POTASSIUM SERPL-SCNC: 4.1 MMOL/L (ref 3.5–5)
PROT SERPL-MCNC: 6.8 G/DL (ref 6.4–8.3)
RBC # BLD AUTO: 4.33 M/UL (ref 3.5–5.5)
SODIUM SERPL-SCNC: 143 MMOL/L (ref 132–146)
TRIGL SERPL-MCNC: 112 MG/DL
VLDLC SERPL CALC-MCNC: 22 MG/DL
WBC OTHER # BLD: 4.9 K/UL (ref 4.5–11.5)

## 2023-11-07 PROCEDURE — 85025 COMPLETE CBC W/AUTO DIFF WBC: CPT

## 2023-11-07 PROCEDURE — 80061 LIPID PANEL: CPT

## 2023-11-07 PROCEDURE — 80053 COMPREHEN METABOLIC PANEL: CPT

## 2023-11-07 PROCEDURE — 36415 COLL VENOUS BLD VENIPUNCTURE: CPT

## 2023-11-10 ENCOUNTER — HOSPITAL ENCOUNTER (OUTPATIENT)
Dept: GENERAL RADIOLOGY | Age: 68
End: 2023-11-10
Attending: FAMILY MEDICINE
Payer: COMMERCIAL

## 2023-11-10 ENCOUNTER — HOSPITAL ENCOUNTER (OUTPATIENT)
Age: 68
Discharge: HOME | End: 2023-11-10
Payer: COMMERCIAL

## 2023-11-10 DIAGNOSIS — R06.02 SHORTNESS OF BREATH: ICD-10-CM

## 2023-11-10 PROCEDURE — 71046 X-RAY EXAM CHEST 2 VIEWS: CPT

## 2024-01-05 ENCOUNTER — HOSPITAL ENCOUNTER (OUTPATIENT)
Dept: GENERAL RADIOLOGY | Age: 69
Discharge: HOME | End: 2024-01-05
Payer: COMMERCIAL

## 2024-01-05 ENCOUNTER — HOSPITAL ENCOUNTER (OUTPATIENT)
Dept: GENERAL RADIOLOGY | Age: 69
End: 2024-01-05
Payer: COMMERCIAL

## 2024-01-05 VITALS — WEIGHT: 142 LBS | HEIGHT: 66 IN | BODY MASS INDEX: 22.82 KG/M2

## 2024-01-05 DIAGNOSIS — Z78.0 POSTMENOPAUSAL: ICD-10-CM

## 2024-01-05 DIAGNOSIS — Z13.820 SCREENING FOR OSTEOPOROSIS: ICD-10-CM

## 2024-01-05 DIAGNOSIS — Z12.31 VISIT FOR SCREENING MAMMOGRAM: ICD-10-CM

## 2024-01-05 PROCEDURE — 77063 BREAST TOMOSYNTHESIS BI: CPT

## 2024-01-05 PROCEDURE — 77080 DXA BONE DENSITY AXIAL: CPT

## 2024-06-14 ENCOUNTER — APPOINTMENT (OUTPATIENT)
Dept: CT IMAGING | Age: 69
End: 2024-06-14
Payer: COMMERCIAL

## 2024-06-14 ENCOUNTER — HOSPITAL ENCOUNTER (EMERGENCY)
Age: 69
Discharge: HOME | End: 2024-06-14
Payer: COMMERCIAL

## 2024-06-14 ENCOUNTER — NURSE TRIAGE (OUTPATIENT)
Dept: OTHER | Facility: CLINIC | Age: 69
End: 2024-06-14

## 2024-06-14 ENCOUNTER — APPOINTMENT (OUTPATIENT)
Dept: GENERAL RADIOLOGY | Age: 69
End: 2024-06-14
Payer: COMMERCIAL

## 2024-06-14 VITALS
HEART RATE: 54 BPM | OXYGEN SATURATION: 96 % | HEIGHT: 66 IN | SYSTOLIC BLOOD PRESSURE: 169 MMHG | BODY MASS INDEX: 23.3 KG/M2 | DIASTOLIC BLOOD PRESSURE: 81 MMHG | TEMPERATURE: 98.1 F | WEIGHT: 145 LBS | RESPIRATION RATE: 18 BRPM

## 2024-06-14 DIAGNOSIS — S50.01XA CONTUSION OF RIGHT ELBOW, INITIAL ENCOUNTER: ICD-10-CM

## 2024-06-14 DIAGNOSIS — S90.32XA CONTUSION OF LEFT FOOT, INITIAL ENCOUNTER: ICD-10-CM

## 2024-06-14 DIAGNOSIS — S09.90XA INJURY OF HEAD, INITIAL ENCOUNTER: Primary | ICD-10-CM
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
## 2024-06-14 PROCEDURE — 99284 EMERGENCY DEPT VISIT MOD MDM: CPT

## 2024-06-14 PROCEDURE — 73630 X-RAY EXAM OF FOOT: CPT

## 2024-06-14 PROCEDURE — 6360000002 HC RX W HCPCS: Performed by: NURSE PRACTITIONER

## 2024-06-14 PROCEDURE — 90714 TD VACC NO PRESV 7 YRS+ IM: CPT | Performed by: NURSE PRACTITIONER

## 2024-06-14 PROCEDURE — 72125 CT NECK SPINE W/O DYE: CPT

## 2024-06-14 PROCEDURE — 90471 IMMUNIZATION ADMIN: CPT | Performed by: NURSE PRACTITIONER

## 2024-06-14 PROCEDURE — 70450 CT HEAD/BRAIN W/O DYE: CPT

## 2024-06-14 RX ADMIN — CLOSTRIDIUM TETANI TOXOID ANTIGEN (FORMALDEHYDE INACTIVATED) AND CORYNEBACTERIUM DIPHTHERIAE TOXOID ANTIGEN (FORMALDEHYDE INACTIVATED) 0.5 ML: 5; 2 INJECTION, SUSPENSION INTRAMUSCULAR at 08:52

## 2024-06-14 ASSESSMENT — LIFESTYLE VARIABLES: HOW OFTEN DO YOU HAVE A DRINK CONTAINING ALCOHOL: NEVER

## 2024-06-14 NOTE — TELEPHONE ENCOUNTER
Location of patient: [place]    Subjective: Caller states \"I took a fall in the bathroom this morning. I hit the back of my head on the wall. My  says it looks like it is skinning. It hurts and burns.\"     Current Symptoms: hit head on wall, landed on floor. No LOC.     Onset: 20 minutes ago    Associated Symptoms: no active bleeding, swollen site at impact. Foot, elbow, shoulder pain.      Pain Severity: 5/10; burning; constant    Temperature: n/a     What has been tried: denies    LMP: NA Pregnant: NA    Recommended disposition: Go to ED Now    Care advice provided, patient verbalizes understanding; denies any other questions or concerns; instructed to call back for any new or worsening symptoms.    Patient/caller agrees to proceed to nearest Emergency Department    Attention Provider:  Thank you for allowing me to participate in the care of your patient.  The patient was connected to triage in response to symptoms provided.   Please do not respond through this encounter as the response is not directed to a shared pool.      Reason for Disposition   Large swelling or bruise (> 2 inches or 5 cm)    Protocols used: Head Injury-ADULT-

## 2024-06-14 NOTE — ED PROVIDER NOTES
was for discharge to home outpatient management vies to follow-up with PCP.    Counseling:   The emergency provider has spoken with the patient and discussed today’s results, in addition to providing specific details for the plan of care and counseling regarding the diagnosis and prognosis.  Questions are answered at this time and they are agreeable with the plan.      --------------------------------- IMPRESSION AND DISPOSITION ---------------------------------    IMPRESSION  1. Injury of head, initial encounter    2. Fall, initial encounter    3. Contusion of right elbow, initial encounter    4. Contusion of left foot, initial encounter        DISPOSITION  Disposition: Discharge to home  Patient condition is good                 Megan Marquez, APRN - CNP  06/14/24 5783

## 2025-01-14 ENCOUNTER — ANESTHESIA EVENT (OUTPATIENT)
Dept: OPERATING ROOM | Age: 70
End: 2025-01-14
Payer: COMMERCIAL

## 2025-01-20 ENCOUNTER — HOSPITAL ENCOUNTER (OUTPATIENT)
Dept: GENERAL RADIOLOGY | Age: 70
Discharge: HOME | End: 2025-01-22
Payer: COMMERCIAL

## 2025-01-20 VITALS — BODY MASS INDEX: 23.95 KG/M2 | WEIGHT: 149 LBS | HEIGHT: 66 IN

## 2025-01-20 DIAGNOSIS — Z12.31 OTHER SCREENING MAMMOGRAM: ICD-10-CM

## 2025-01-20 PROCEDURE — 77063 BREAST TOMOSYNTHESIS BI: CPT

## 2025-01-21 ENCOUNTER — PREP FOR PROCEDURE (OUTPATIENT)
Dept: UROLOGY | Age: 70
End: 2025-01-21

## 2025-01-21 RX ORDER — SODIUM CHLORIDE 0.9 % (FLUSH) 0.9 %
5-40 SYRINGE (ML) INJECTION EVERY 12 HOURS SCHEDULED
Status: CANCELLED | OUTPATIENT
Start: 2025-01-21

## 2025-01-21 RX ORDER — SODIUM CHLORIDE 0.9 % (FLUSH) 0.9 %
5-40 SYRINGE (ML) INJECTION PRN
Status: CANCELLED | OUTPATIENT
Start: 2025-01-21

## 2025-01-21 RX ORDER — SODIUM CHLORIDE 9 MG/ML
INJECTION, SOLUTION INTRAVENOUS PRN
Status: CANCELLED | OUTPATIENT
Start: 2025-01-21

## 2025-01-22 ENCOUNTER — HOSPITAL ENCOUNTER (OUTPATIENT)
Dept: PREADMISSION TESTING | Age: 70
Setting detail: OUTPATIENT SURGERY
Discharge: HOME | End: 2025-01-22
Payer: COMMERCIAL

## 2025-01-22 LAB
ABO + RH BLD: NORMAL
ANION GAP SERPL CALCULATED.3IONS-SCNC: 13 MMOL/L (ref 7–16)
ARM BAND NUMBER: NORMAL
BLOOD BANK SAMPLE EXPIRATION: NORMAL
BLOOD GROUP ANTIBODIES SERPL: NEGATIVE
BUN SERPL-MCNC: 20 MG/DL (ref 6–23)
CALCIUM SERPL-MCNC: 9.3 MG/DL (ref 8.6–10.2)
CHLORIDE SERPL-SCNC: 97 MMOL/L (ref 98–107)
CO2 SERPL-SCNC: 27 MMOL/L (ref 22–29)
CREAT SERPL-MCNC: 1 MG/DL (ref 0.5–1)
ERYTHROCYTE [DISTWIDTH] IN BLOOD BY AUTOMATED COUNT: 12.1 % (ref 11.5–15)
GFR, ESTIMATED: 63 ML/MIN/1.73M2
GLUCOSE SERPL-MCNC: 93 MG/DL (ref 74–99)
HCT VFR BLD AUTO: 44.8 % (ref 34–48)
HGB BLD-MCNC: 14.7 G/DL (ref 11.5–15.5)
MCH RBC QN AUTO: 30.8 PG (ref 26–35)
MCHC RBC AUTO-ENTMCNC: 32.8 G/DL (ref 32–34.5)
MCV RBC AUTO: 93.7 FL (ref 80–99.9)
PLATELET # BLD AUTO: 270 K/UL (ref 130–450)
PMV BLD AUTO: 10.6 FL (ref 7–12)
POTASSIUM SERPL-SCNC: 3.2 MMOL/L (ref 3.5–5)
RBC # BLD AUTO: 4.78 M/UL (ref 3.5–5.5)
SODIUM SERPL-SCNC: 137 MMOL/L (ref 132–146)
WBC OTHER # BLD: 8.1 K/UL (ref 4.5–11.5)

## 2025-01-22 PROCEDURE — 86901 BLOOD TYPING SEROLOGIC RH(D): CPT

## 2025-01-22 PROCEDURE — 85027 COMPLETE CBC AUTOMATED: CPT

## 2025-01-22 PROCEDURE — 86850 RBC ANTIBODY SCREEN: CPT

## 2025-01-22 PROCEDURE — 80048 BASIC METABOLIC PNL TOTAL CA: CPT

## 2025-01-22 PROCEDURE — 86900 BLOOD TYPING SEROLOGIC ABO: CPT

## 2025-01-22 RX ORDER — VITAMIN B COMPLEX
1 CAPSULE ORAL DAILY
COMMUNITY

## 2025-01-22 RX ORDER — ESCITALOPRAM OXALATE 10 MG/1
10 TABLET ORAL DAILY
COMMUNITY

## 2025-01-22 RX ORDER — ROSUVASTATIN CALCIUM 10 MG/1
10 TABLET, COATED ORAL DAILY
COMMUNITY

## 2025-01-22 NOTE — DISCHARGE INSTRUCTIONS
Nayla Centeno, RN  Registered Nurse     Progress Notes     Signed     Date of Service: 1/22/2025 12:46 PM     Signed         [facility] PRE-ADMISSION TESTING INSTRUCTIONS     The Preadmission Testing patient is instructed accordingly using the following criteria (check applicable):     ARRIVAL INSTRUCTIONS:  [x] Parking the day of Surgery is located in the Main Entrance lot.  Upon entering the door, make an immediate right to the surgery reception desk     [x] Bring photo ID and insurance card     [x] Bring in a copy of Living will or Durable Power of  papers.     [x] Please be sure to arrange for responsible adult to provide transportation to and from the hospital     [x] Please arrange for responsible adult to be with you for the 24 hour period post procedure due to having anesthesia     [x] If you awake am of surgery not feeling well or have temperature >100 please call [phone]     GENERAL INSTRUCTIONS:     [x] May have clear liquids until 4 hours prior to surgery. Examples include water, fruit juices (no pulp), jello, popsicles, black coffee or tea,               No gum, candy or mints, or solid food after midnight prior to surgery     [x] You may brush your teeth, but do not swallow any water     [x] Take medications as instructed with 1-2 oz of water     [x] Stop herbal supplements and vitamins 5 days prior to procedure        [x] Shower or bath with soap, lather and rinse well, AM of Surgery, no lotion, powders or creams     [x] No tobacco products within 24 hours of surgery      [x] No alcohol or illegal drug use within 24 hours of surgery.     [x] Jewelry, body piercing's, eyeglasses, contact lenses and dentures are not permitted into surgery (bring cases)                            [x] Please do not wear any nail polish, make up or hair products on the day of surgery     [x] You can expect a call the business day prior to procedure to notify you if your arrival

## 2025-01-22 NOTE — PROGRESS NOTES
[facility] PRE-ADMISSION TESTING INSTRUCTIONS    The Preadmission Testing patient is instructed accordingly using the following criteria (check applicable):    ARRIVAL INSTRUCTIONS:  [x] Parking the day of Surgery is located in the Main Entrance lot.  Upon entering the door, make an immediate right to the surgery reception desk    [x] Bring photo ID and insurance card    [x] Bring in a copy of Living will or Durable Power of  papers.    [x] Please be sure to arrange for responsible adult to provide transportation to and from the hospital    [x] Please arrange for responsible adult to be with you for the 24 hour period post procedure due to having anesthesia    [x] If you awake am of surgery not feeling well or have temperature >100 please call [phone]    GENERAL INSTRUCTIONS:    [x] May have clear liquids until 4 hours prior to surgery. Examples include water, fruit juices (no pulp), jello, popsicles, black coffee or tea,               No gum, candy or mints, or solid food after midnight prior to surgery    [x] You may brush your teeth, but do not swallow any water    [x] Take medications as instructed with 1-2 oz of water    [x] Stop herbal supplements and vitamins 5 days prior to procedure      [x] Shower or bath with soap, lather and rinse well, AM of Surgery, no lotion, powders or creams    [x] No tobacco products within 24 hours of surgery     [x] No alcohol or illegal drug use within 24 hours of surgery.    [x] Jewelry, body piercing's, eyeglasses, contact lenses and dentures are not permitted into surgery (bring cases)      [x] Please do not wear any nail polish, make up or hair products on the day of surgery    [x] You can expect a call the business day prior to procedure to notify you if your arrival time changes    [x] If you receive a survey after surgery we would greatly appreciate your comments    [x] Please notify surgeon if you develop any illness between now

## 2025-01-23 NOTE — PROGRESS NOTES
Reviewed pt's potassium level of 3.2 drawn yesterday 1/22/2024 with Dr. Portillo. No needs identified at this time. OK to proceed with surgery

## 2025-01-24 ENCOUNTER — HOSPITAL ENCOUNTER (OUTPATIENT)
Age: 70
Setting detail: OBSERVATION
Discharge: HOME | End: 2025-01-25
Attending: OBSTETRICS & GYNECOLOGY | Admitting: OBSTETRICS & GYNECOLOGY
Payer: COMMERCIAL

## 2025-01-24 ENCOUNTER — ANESTHESIA (OUTPATIENT)
Dept: OPERATING ROOM | Age: 70
End: 2025-01-24
Payer: COMMERCIAL

## 2025-01-24 DIAGNOSIS — G89.18 POST-OP PAIN: Primary | ICD-10-CM

## 2025-01-24 DIAGNOSIS — N81.11 CYSTOCELE, MIDLINE: ICD-10-CM

## 2025-01-24 DIAGNOSIS — N81.9 FEMALE GENITAL PROLAPSE, UNSPECIFIED TYPE: ICD-10-CM

## 2025-01-24 DIAGNOSIS — Z90.710 S/P LAPAROSCOPIC HYSTERECTOMY: ICD-10-CM

## 2025-01-24 PROBLEM — N81.4 UTEROVAGINAL PROLAPSE: Status: ACTIVE | Noted: 2025-01-24

## 2025-01-24 LAB — POTASSIUM SERPL-SCNC: 4.3 MMOL/L (ref 3.5–5)

## 2025-01-24 PROCEDURE — 2580000003 HC RX 258: Performed by: OBSTETRICS & GYNECOLOGY

## 2025-01-24 PROCEDURE — 3700000001 HC ADD 15 MINUTES (ANESTHESIA): Performed by: OBSTETRICS & GYNECOLOGY

## 2025-01-24 PROCEDURE — 6360000002 HC RX W HCPCS

## 2025-01-24 PROCEDURE — 7100000000 HC PACU RECOVERY - FIRST 15 MIN: Performed by: OBSTETRICS & GYNECOLOGY

## 2025-01-24 PROCEDURE — 84132 ASSAY OF SERUM POTASSIUM: CPT

## 2025-01-24 PROCEDURE — 6360000002 HC RX W HCPCS: Performed by: UROLOGY

## 2025-01-24 PROCEDURE — 88307 TISSUE EXAM BY PATHOLOGIST: CPT

## 2025-01-24 PROCEDURE — 6360000002 HC RX W HCPCS: Performed by: NURSE ANESTHETIST, CERTIFIED REGISTERED

## 2025-01-24 PROCEDURE — G0378 HOSPITAL OBSERVATION PER HR: HCPCS

## 2025-01-24 PROCEDURE — 2580000003 HC RX 258

## 2025-01-24 PROCEDURE — 2500000003 HC RX 250 WO HCPCS

## 2025-01-24 PROCEDURE — 3600000009 HC SURGERY ROBOT BASE: Performed by: OBSTETRICS & GYNECOLOGY

## 2025-01-24 PROCEDURE — 7100000001 HC PACU RECOVERY - ADDTL 15 MIN: Performed by: OBSTETRICS & GYNECOLOGY

## 2025-01-24 PROCEDURE — 3600000019 HC SURGERY ROBOT ADDTL 15MIN: Performed by: OBSTETRICS & GYNECOLOGY

## 2025-01-24 PROCEDURE — C1758 CATHETER, URETERAL: HCPCS | Performed by: OBSTETRICS & GYNECOLOGY

## 2025-01-24 PROCEDURE — 6360000002 HC RX W HCPCS: Performed by: ANESTHESIOLOGY

## 2025-01-24 PROCEDURE — 6370000000 HC RX 637 (ALT 250 FOR IP): Performed by: OBSTETRICS & GYNECOLOGY

## 2025-01-24 PROCEDURE — 6370000000 HC RX 637 (ALT 250 FOR IP)

## 2025-01-24 PROCEDURE — 6360000002 HC RX W HCPCS: Performed by: OBSTETRICS & GYNECOLOGY

## 2025-01-24 PROCEDURE — 2709999900 HC NON-CHARGEABLE SUPPLY: Performed by: OBSTETRICS & GYNECOLOGY

## 2025-01-24 PROCEDURE — C1781 MESH (IMPLANTABLE): HCPCS | Performed by: OBSTETRICS & GYNECOLOGY

## 2025-01-24 PROCEDURE — 2500000003 HC RX 250 WO HCPCS: Performed by: NURSE ANESTHETIST, CERTIFIED REGISTERED

## 2025-01-24 PROCEDURE — 58542 LSH W/T/O UT 250 G OR LESS: CPT | Performed by: PHYSICIAN ASSISTANT

## 2025-01-24 PROCEDURE — 3700000000 HC ANESTHESIA ATTENDED CARE: Performed by: OBSTETRICS & GYNECOLOGY

## 2025-01-24 PROCEDURE — S2900 ROBOTIC SURGICAL SYSTEM: HCPCS | Performed by: OBSTETRICS & GYNECOLOGY

## 2025-01-24 DEVICE — POLYPROPYLENE MESH FOR SACROCOLPOSUSPENSION/SACROCOLPOPEXY - Y
Type: IMPLANTABLE DEVICE | Site: VAGINA | Status: FUNCTIONAL
Brand: RESTORELLE

## 2025-01-24 RX ORDER — SODIUM CHLORIDE 0.9 % (FLUSH) 0.9 %
5-40 SYRINGE (ML) INJECTION EVERY 12 HOURS SCHEDULED
Status: DISCONTINUED | OUTPATIENT
Start: 2025-01-24 | End: 2025-01-24 | Stop reason: HOSPADM

## 2025-01-24 RX ORDER — DIPHENHYDRAMINE HYDROCHLORIDE 50 MG/ML
INJECTION INTRAMUSCULAR; INTRAVENOUS
Status: DISCONTINUED | OUTPATIENT
Start: 2025-01-24 | End: 2025-01-24 | Stop reason: SDUPTHER

## 2025-01-24 RX ORDER — SCOPOLAMINE 1 MG/3D
PATCH, EXTENDED RELEASE TRANSDERMAL
Status: COMPLETED
Start: 2025-01-24 | End: 2025-01-24

## 2025-01-24 RX ORDER — HYDROCODONE BITARTRATE AND ACETAMINOPHEN 5; 325 MG/1; MG/1
1 TABLET ORAL EVERY 4 HOURS PRN
Status: DISCONTINUED | OUTPATIENT
Start: 2025-01-24 | End: 2025-01-25 | Stop reason: HOSPADM

## 2025-01-24 RX ORDER — BISACODYL 10 MG
10 SUPPOSITORY, RECTAL RECTAL DAILY PRN
Status: DISCONTINUED | OUTPATIENT
Start: 2025-01-24 | End: 2025-01-25 | Stop reason: HOSPADM

## 2025-01-24 RX ORDER — TRIAMTERENE AND HYDROCHLOROTHIAZIDE 37.5; 25 MG/1; MG/1
2 TABLET ORAL DAILY
Status: DISCONTINUED | OUTPATIENT
Start: 2025-01-24 | End: 2025-01-25 | Stop reason: HOSPADM

## 2025-01-24 RX ORDER — ONDANSETRON 2 MG/ML
INJECTION INTRAMUSCULAR; INTRAVENOUS
Status: DISCONTINUED | OUTPATIENT
Start: 2025-01-24 | End: 2025-01-24 | Stop reason: SDUPTHER

## 2025-01-24 RX ORDER — HYDROMORPHONE HYDROCHLORIDE 2 MG/ML
INJECTION, SOLUTION INTRAMUSCULAR; INTRAVENOUS; SUBCUTANEOUS
Status: DISCONTINUED | OUTPATIENT
Start: 2025-01-24 | End: 2025-01-24 | Stop reason: SDUPTHER

## 2025-01-24 RX ORDER — ONDANSETRON 2 MG/ML
4 INJECTION INTRAMUSCULAR; INTRAVENOUS
Status: DISCONTINUED | OUTPATIENT
Start: 2025-01-24 | End: 2025-01-24 | Stop reason: HOSPADM

## 2025-01-24 RX ORDER — FENTANYL CITRATE 50 UG/ML
INJECTION, SOLUTION INTRAMUSCULAR; INTRAVENOUS
Status: DISCONTINUED | OUTPATIENT
Start: 2025-01-24 | End: 2025-01-24 | Stop reason: SDUPTHER

## 2025-01-24 RX ORDER — BUPIVACAINE HYDROCHLORIDE 5 MG/ML
INJECTION, SOLUTION EPIDURAL; INTRACAUDAL PRN
Status: DISCONTINUED | OUTPATIENT
Start: 2025-01-24 | End: 2025-01-24 | Stop reason: ALTCHOICE

## 2025-01-24 RX ORDER — IBUPROFEN 800 MG/1
800 TABLET, FILM COATED ORAL EVERY 8 HOURS PRN
Status: DISCONTINUED | OUTPATIENT
Start: 2025-01-24 | End: 2025-01-25 | Stop reason: HOSPADM

## 2025-01-24 RX ORDER — ACETAMINOPHEN 325 MG/1
650 TABLET ORAL EVERY 6 HOURS PRN
Status: DISCONTINUED | OUTPATIENT
Start: 2025-01-24 | End: 2025-01-25 | Stop reason: HOSPADM

## 2025-01-24 RX ORDER — LIDOCAINE HYDROCHLORIDE 20 MG/ML
INJECTION, SOLUTION EPIDURAL; INFILTRATION; INTRACAUDAL; PERINEURAL
Status: DISCONTINUED | OUTPATIENT
Start: 2025-01-24 | End: 2025-01-24 | Stop reason: SDUPTHER

## 2025-01-24 RX ORDER — SODIUM CHLORIDE 0.9 % (FLUSH) 0.9 %
5-40 SYRINGE (ML) INJECTION EVERY 12 HOURS SCHEDULED
Status: DISCONTINUED | OUTPATIENT
Start: 2025-01-24 | End: 2025-01-25 | Stop reason: HOSPADM

## 2025-01-24 RX ORDER — ONDANSETRON 4 MG/1
4 TABLET, ORALLY DISINTEGRATING ORAL EVERY 8 HOURS PRN
Status: DISCONTINUED | OUTPATIENT
Start: 2025-01-24 | End: 2025-01-25 | Stop reason: HOSPADM

## 2025-01-24 RX ORDER — SCOPOLAMINE 1 MG/3D
PATCH, EXTENDED RELEASE TRANSDERMAL
Status: DISCONTINUED | OUTPATIENT
Start: 2025-01-24 | End: 2025-01-24 | Stop reason: SDUPTHER

## 2025-01-24 RX ORDER — ROCURONIUM BROMIDE 10 MG/ML
INJECTION, SOLUTION INTRAVENOUS
Status: DISCONTINUED | OUTPATIENT
Start: 2025-01-24 | End: 2025-01-24 | Stop reason: SDUPTHER

## 2025-01-24 RX ORDER — SODIUM CHLORIDE, SODIUM LACTATE, POTASSIUM CHLORIDE, CALCIUM CHLORIDE 600; 310; 30; 20 MG/100ML; MG/100ML; MG/100ML; MG/100ML
INJECTION, SOLUTION INTRAVENOUS CONTINUOUS
Status: DISCONTINUED | OUTPATIENT
Start: 2025-01-24 | End: 2025-01-25 | Stop reason: HOSPADM

## 2025-01-24 RX ORDER — SIMETHICONE 80 MG
80 TABLET,CHEWABLE ORAL
Status: DISCONTINUED | OUTPATIENT
Start: 2025-01-25 | End: 2025-01-25 | Stop reason: HOSPADM

## 2025-01-24 RX ORDER — SODIUM CHLORIDE 0.9 % (FLUSH) 0.9 %
5-40 SYRINGE (ML) INJECTION PRN
Status: DISCONTINUED | OUTPATIENT
Start: 2025-01-24 | End: 2025-01-24 | Stop reason: HOSPADM

## 2025-01-24 RX ORDER — METOPROLOL SUCCINATE 50 MG/1
50 TABLET, EXTENDED RELEASE ORAL DAILY
Status: DISCONTINUED | OUTPATIENT
Start: 2025-01-25 | End: 2025-01-25 | Stop reason: HOSPADM

## 2025-01-24 RX ORDER — KETAMINE HYDROCHLORIDE 10 MG/ML
INJECTION, SOLUTION INTRAMUSCULAR; INTRAVENOUS
Status: DISCONTINUED | OUTPATIENT
Start: 2025-01-24 | End: 2025-01-24 | Stop reason: SDUPTHER

## 2025-01-24 RX ORDER — ONDANSETRON 2 MG/ML
4 INJECTION INTRAMUSCULAR; INTRAVENOUS EVERY 6 HOURS PRN
Status: DISCONTINUED | OUTPATIENT
Start: 2025-01-24 | End: 2025-01-25 | Stop reason: HOSPADM

## 2025-01-24 RX ORDER — ESCITALOPRAM OXALATE 10 MG/1
10 TABLET ORAL DAILY
Status: DISCONTINUED | OUTPATIENT
Start: 2025-01-24 | End: 2025-01-25 | Stop reason: HOSPADM

## 2025-01-24 RX ORDER — SODIUM CHLORIDE 9 MG/ML
INJECTION, SOLUTION INTRAVENOUS PRN
Status: DISCONTINUED | OUTPATIENT
Start: 2025-01-24 | End: 2025-01-24 | Stop reason: HOSPADM

## 2025-01-24 RX ORDER — PROPOFOL 10 MG/ML
INJECTION, EMULSION INTRAVENOUS
Status: DISCONTINUED | OUTPATIENT
Start: 2025-01-24 | End: 2025-01-24 | Stop reason: SDUPTHER

## 2025-01-24 RX ORDER — DOCUSATE SODIUM 100 MG/1
100 CAPSULE, LIQUID FILLED ORAL 2 TIMES DAILY
Status: DISCONTINUED | OUTPATIENT
Start: 2025-01-24 | End: 2025-01-25 | Stop reason: HOSPADM

## 2025-01-24 RX ORDER — SODIUM CHLORIDE 0.9 % (FLUSH) 0.9 %
5-40 SYRINGE (ML) INJECTION PRN
Status: DISCONTINUED | OUTPATIENT
Start: 2025-01-24 | End: 2025-01-25 | Stop reason: HOSPADM

## 2025-01-24 RX ORDER — MORPHINE SULFATE 2 MG/ML
1 INJECTION, SOLUTION INTRAMUSCULAR; INTRAVENOUS EVERY 5 MIN PRN
Status: DISCONTINUED | OUTPATIENT
Start: 2025-01-24 | End: 2025-01-24 | Stop reason: HOSPADM

## 2025-01-24 RX ORDER — POTASSIUM CHLORIDE 750 MG/1
10 TABLET, EXTENDED RELEASE ORAL DAILY
Status: DISCONTINUED | OUTPATIENT
Start: 2025-01-24 | End: 2025-01-25 | Stop reason: HOSPADM

## 2025-01-24 RX ORDER — MIDAZOLAM HYDROCHLORIDE 1 MG/ML
INJECTION, SOLUTION INTRAMUSCULAR; INTRAVENOUS
Status: DISCONTINUED | OUTPATIENT
Start: 2025-01-24 | End: 2025-01-24 | Stop reason: SDUPTHER

## 2025-01-24 RX ORDER — SODIUM CHLORIDE 9 MG/ML
INJECTION, SOLUTION INTRAVENOUS
Status: DISCONTINUED | OUTPATIENT
Start: 2025-01-24 | End: 2025-01-24 | Stop reason: SDUPTHER

## 2025-01-24 RX ORDER — PROCHLORPERAZINE EDISYLATE 5 MG/ML
10 INJECTION INTRAMUSCULAR; INTRAVENOUS EVERY 6 HOURS PRN
Status: DISCONTINUED | OUTPATIENT
Start: 2025-01-24 | End: 2025-01-25 | Stop reason: HOSPADM

## 2025-01-24 RX ORDER — DEXAMETHASONE SODIUM PHOSPHATE 4 MG/ML
INJECTION, SOLUTION INTRA-ARTICULAR; INTRALESIONAL; INTRAMUSCULAR; INTRAVENOUS; SOFT TISSUE
Status: DISCONTINUED | OUTPATIENT
Start: 2025-01-24 | End: 2025-01-24 | Stop reason: SDUPTHER

## 2025-01-24 RX ORDER — NALOXONE HYDROCHLORIDE 0.4 MG/ML
INJECTION, SOLUTION INTRAMUSCULAR; INTRAVENOUS; SUBCUTANEOUS PRN
Status: DISCONTINUED | OUTPATIENT
Start: 2025-01-24 | End: 2025-01-24 | Stop reason: HOSPADM

## 2025-01-24 RX ORDER — SODIUM CHLORIDE 9 MG/ML
INJECTION, SOLUTION INTRAVENOUS PRN
Status: DISCONTINUED | OUTPATIENT
Start: 2025-01-24 | End: 2025-01-25 | Stop reason: HOSPADM

## 2025-01-24 RX ORDER — HYDROCODONE BITARTRATE AND ACETAMINOPHEN 5; 325 MG/1; MG/1
2 TABLET ORAL EVERY 4 HOURS PRN
Status: DISCONTINUED | OUTPATIENT
Start: 2025-01-24 | End: 2025-01-25 | Stop reason: HOSPADM

## 2025-01-24 RX ORDER — LIDOCAINE HYDROCHLORIDE ANHYDROUS AND DEXTROSE MONOHYDRATE 5; 400 G/100ML; MG/100ML
2 INJECTION, SOLUTION INTRAVENOUS ONCE
Status: COMPLETED | OUTPATIENT
Start: 2025-01-24 | End: 2025-01-24

## 2025-01-24 RX ORDER — SUCCINYLCHOLINE CHLORIDE 20 MG/ML
INJECTION INTRAMUSCULAR; INTRAVENOUS
Status: DISCONTINUED | OUTPATIENT
Start: 2025-01-24 | End: 2025-01-24 | Stop reason: SDUPTHER

## 2025-01-24 RX ORDER — CEFAZOLIN SODIUM 1 G/3ML
INJECTION, POWDER, FOR SOLUTION INTRAMUSCULAR; INTRAVENOUS PRN
Status: DISCONTINUED | OUTPATIENT
Start: 2025-01-24 | End: 2025-01-24 | Stop reason: ALTCHOICE

## 2025-01-24 RX ORDER — ROSUVASTATIN CALCIUM 10 MG/1
10 TABLET, COATED ORAL DAILY
Status: DISCONTINUED | OUTPATIENT
Start: 2025-01-24 | End: 2025-01-25 | Stop reason: HOSPADM

## 2025-01-24 RX ADMIN — HYDROMORPHONE HYDROCHLORIDE 0.5 MG: 2 INJECTION, SOLUTION INTRAMUSCULAR; INTRAVENOUS; SUBCUTANEOUS at 12:45

## 2025-01-24 RX ADMIN — LIDOCAINE HYDROCHLORIDE 60 MG: 20 INJECTION, SOLUTION EPIDURAL; INFILTRATION; INTRACAUDAL; PERINEURAL at 12:17

## 2025-01-24 RX ADMIN — KETAMINE HYDROCHLORIDE 20 MG: 10 INJECTION INTRAMUSCULAR; INTRAVENOUS at 12:17

## 2025-01-24 RX ADMIN — SODIUM CHLORIDE: 9 INJECTION, SOLUTION INTRAVENOUS at 13:33

## 2025-01-24 RX ADMIN — SUGAMMADEX 200 MG: 100 INJECTION, SOLUTION INTRAVENOUS at 14:46

## 2025-01-24 RX ADMIN — ROSUVASTATIN CALCIUM 10 MG: 10 TABLET, FILM COATED ORAL at 19:20

## 2025-01-24 RX ADMIN — ROCURONIUM BROMIDE 40 MG: 10 INJECTION, SOLUTION INTRAVENOUS at 12:28

## 2025-01-24 RX ADMIN — HYDROMORPHONE HYDROCHLORIDE 0.5 MG: 2 INJECTION, SOLUTION INTRAMUSCULAR; INTRAVENOUS; SUBCUTANEOUS at 13:10

## 2025-01-24 RX ADMIN — ONDANSETRON 4 MG: 2 INJECTION INTRAMUSCULAR; INTRAVENOUS at 14:26

## 2025-01-24 RX ADMIN — WATER 2000 MG: 1 INJECTION INTRAMUSCULAR; INTRAVENOUS; SUBCUTANEOUS at 12:25

## 2025-01-24 RX ADMIN — IBUPROFEN 800 MG: 800 TABLET, FILM COATED ORAL at 19:19

## 2025-01-24 RX ADMIN — SCOPALAMINE 1 PATCH: 1 PATCH, EXTENDED RELEASE TRANSDERMAL at 11:55

## 2025-01-24 RX ADMIN — FENTANYL CITRATE 50 MCG: 50 INJECTION, SOLUTION INTRAMUSCULAR; INTRAVENOUS at 12:17

## 2025-01-24 RX ADMIN — MIDAZOLAM 2 MG: 1 INJECTION INTRAMUSCULAR; INTRAVENOUS at 12:02

## 2025-01-24 RX ADMIN — HYDROMORPHONE HYDROCHLORIDE 0.5 MG: 2 INJECTION, SOLUTION INTRAMUSCULAR; INTRAVENOUS; SUBCUTANEOUS at 13:41

## 2025-01-24 RX ADMIN — SUGAMMADEX 200 MG: 100 INJECTION, SOLUTION INTRAVENOUS at 14:36

## 2025-01-24 RX ADMIN — LIDOCAINE HYDROCHLORIDE 2 MG/KG/HR: 4 INJECTION, SOLUTION INTRAVENOUS at 12:25

## 2025-01-24 RX ADMIN — SODIUM CHLORIDE: 9 INJECTION, SOLUTION INTRAVENOUS at 12:18

## 2025-01-24 RX ADMIN — PROCHLORPERAZINE EDISYLATE 10 MG: 5 INJECTION INTRAMUSCULAR; INTRAVENOUS at 23:45

## 2025-01-24 RX ADMIN — DIPHENHYDRAMINE HYDROCHLORIDE 25 MG: 50 INJECTION, SOLUTION INTRAMUSCULAR; INTRAVENOUS at 13:30

## 2025-01-24 RX ADMIN — SODIUM CHLORIDE, POTASSIUM CHLORIDE, SODIUM LACTATE AND CALCIUM CHLORIDE: 600; 310; 30; 20 INJECTION, SOLUTION INTRAVENOUS at 20:00

## 2025-01-24 RX ADMIN — ROCURONIUM BROMIDE 10 MG: 10 INJECTION, SOLUTION INTRAVENOUS at 12:16

## 2025-01-24 RX ADMIN — SUCCINYLCHOLINE CHLORIDE 120 MG: 20 INJECTION, SOLUTION INTRAMUSCULAR; INTRAVENOUS at 12:17

## 2025-01-24 RX ADMIN — ROCURONIUM BROMIDE 20 MG: 10 INJECTION, SOLUTION INTRAVENOUS at 13:12

## 2025-01-24 RX ADMIN — DOCUSATE SODIUM 100 MG: 100 CAPSULE, LIQUID FILLED ORAL at 19:58

## 2025-01-24 RX ADMIN — PROPOFOL 170 MG: 10 INJECTION, EMULSION INTRAVENOUS at 12:17

## 2025-01-24 RX ADMIN — FENTANYL CITRATE 50 MCG: 50 INJECTION, SOLUTION INTRAMUSCULAR; INTRAVENOUS at 12:28

## 2025-01-24 RX ADMIN — DEXAMETHASONE SODIUM PHOSPHATE 8 MG: 4 INJECTION, SOLUTION INTRAMUSCULAR; INTRAVENOUS at 12:28

## 2025-01-24 RX ADMIN — SODIUM CHLORIDE: 9 INJECTION, SOLUTION INTRAVENOUS at 12:00

## 2025-01-24 ASSESSMENT — PAIN SCALES - GENERAL: PAINLEVEL_OUTOF10: 6

## 2025-01-24 ASSESSMENT — PAIN - FUNCTIONAL ASSESSMENT: PAIN_FUNCTIONAL_ASSESSMENT: 0-10

## 2025-01-24 ASSESSMENT — LIFESTYLE VARIABLES: SMOKING_STATUS: 0

## 2025-01-24 ASSESSMENT — PAIN DESCRIPTION - LOCATION: LOCATION: ABDOMEN

## 2025-01-24 ASSESSMENT — PAIN DESCRIPTION - DESCRIPTORS: DESCRIPTORS: DISCOMFORT

## 2025-01-24 NOTE — DISCHARGE INSTRUCTIONS
Follow up Dr. Rubén Singh in 1-4 week, [phone]    Dr Parker's Discharge Instructions  General  No heavy lifting  No driving for 1-2 weeks  No douching, intercourse or tampons until final post-op visit  May take shower or tub bath, dry incisions and leave open to air afterward  May walk and climb stairs as tolerated    Follow-up   Continue stool softener, anti-gas pill and/or laxative as necessary  Call for appointment for incision check for 2 weeks after your surgery  Call for fever, chills, redness or warmness of your incisions, worsening abdominal pain, protracted nausea or vomiting, or any other concerns

## 2025-01-24 NOTE — OP NOTE
Operative Note      Patient: Solange Morel  YOB: 1955  MRN: [id number]    Date of Procedure: 1/24/2025    Pre-Op Diagnosis Codes:      * Female genital prolapse, unspecified type [N81.9]     * Cystocele, midline [N81.11]    Post-Op Diagnosis: Same       Procedure(s):  LAPAROSCOPIC ROBOTIC XI ASSISTED SUPRACERVICAL HYSTERECTOMY BILATERAL SALPINGO OOPHORECTOMY  CYSTOSCOPY RIGHT EXTERNAL URETERAL CATHETER INSERTION LAPAROSCOPIC ROBOTIC XI ASSISTED ABDOMINAL SACRAL COLPOPEXY WITH Y MESH    Surgeon(s):  Michele Parker MD Memo, Mark A, DO    Assistant:   First Assistant: Josef Hameed  Physician Assistant: Sarah Mason PA-C    Anesthesia: General    Estimated Blood Loss (mL): Minimal    Complications: None    Specimens:   ID Type Source Tests Collected by Time Destination   A : , Tissue Tissue SURGICAL PATHOLOGY Michele Parker MD 1/24/2025 1305        Implants:  Implant Name Type Inv. Item Serial No.  Lot No. LRB No. Used Action   MESH GYN [id number] POLYPR UNIDIR Y CNTOUR FOR TRANSABDOMINAL - [id number]  MESH GYN [id number] POLYPR UNIDIR Y CNTOUR FOR TRANSABDOMINAL  COLOPLAST JACQUE-WD [id number]  1 Implanted         Drains:   Urinary Catheter 01/24/25 2 Way;Galvez (Active)       [REMOVED] Ureteral Drain/Stent 01/24/25 Right Ureter (Removed)       Findings:  Infection Present At Time Of Surgery (PATOS) (choose all levels that have infection present):  No infection present  Other Findings: DATE OF PROCEDURE:  04/26/2024     SURGEON:  Rubén Singh DO     PREOPERATIVE DIAGNOSES:    1. Stage II going on III cystocele.  2. Stage II uterine prolapse.     POSTOPERATIVE DIAGNOSES:    1. Stage II going on III cystocele.  2. Stage II uterine prolapse.     PROCEDURES:    1. Cystoscopy.  2. Right external ureteral catheter insertion and subsequent removal.  3. Robotic-assisted supracervical hysterectomy performed by Dr. Marty Parker.  4. Robotic-assisted abdominal sacrocolpopexy with a Y-mesh.     ANESTHESIA:

## 2025-01-24 NOTE — ANESTHESIA POSTPROCEDURE EVALUATION
Department of Anesthesiology  Postprocedure Note    Patient: Solange Morel  MRN: [id number]  YOB: 1955  Date of evaluation: 1/24/2025    Procedure Summary       Date: 01/24/25 Room / Location: [address]    Anesthesia Start: 1202 Anesthesia Stop:     Procedures:       LAPAROSCOPIC ROBOTIC XI ASSISTED SUPRACERVICAL HYSTERECTOMY BILATERAL SALPINGO OOPHORECTOMY (Bilateral)      CYSTOSCOPY RIGHT EXTERNAL URETERAL CATHETER INSERTION LAPAROSCOPIC ROBOTIC XI ASSISTED ABDOMINAL SACRAL COLPOPEXY WITH Y MESH Diagnosis:       Female genital prolapse, unspecified type      Cystocele, midline      (Female genital prolapse, unspecified type [N81.9])      (Cystocele, midline [N81.11])    Surgeons: Michele Parker MD; Rubén Singh DO Responsible Provider: Angel Solis DO    Anesthesia Type: General ASA Status: 2            Anesthesia Type: General    Justin Phase I: Justin Score: 10    Justin Phase II:      Anesthesia Post Evaluation    Patient location during evaluation: PACU  Patient participation: complete - patient participated  Level of consciousness: sleepy but conscious  Pain score: 0  Airway patency: patent  Nausea & Vomiting: no vomiting and no nausea  Cardiovascular status: hemodynamically stable  Respiratory status: acceptable  Hydration status: stable  Pain management: adequate        No notable events documented.

## 2025-01-24 NOTE — OP NOTE
Operative Note      Patient: Solnage Morel  YOB: 1955  MRN: [id number]    Date of Procedure: 1/24/2025    Pre-Op Diagnosis:  Uterovaginal prolapse     Post-Op Diagnosis: Same                                   Fibroid uterus                                   Pelvic adhesions       Procedure:  Robotic-assisted supracervical hysterectomy with bilateral salpingo-oophorectomies, lysis of colonic adhesions    [place]:  Dr Parker    1st Assistant: LORNA Mason PA-C, whose presence was necessary throughout the procedure for uterine manipulation in the absence of a qualified resident    Anesthesia: General    Estimated Blood Loss (mL): 5cc    Complications: None    Specimens:   Supracervical hyst and ovaries and tubes     Drains: Galvez    Findings:  Fibroid  uterus with stage 2-3 anterior and stage 2 posterior vaginal wall prolapse    Condition:  Stable    Detailed Description of Procedure:   [id number]    Electronically signed by Michele Parker MD on 1/24/2025 at 7:54 AM

## 2025-01-24 NOTE — ANESTHESIA PRE PROCEDURE
Department of Anesthesiology  Preprocedure Note       Name:  Solange Morel   Age:  69 y.o.  :  1955                                          MRN:  [id number]         Date:  2025      Surgeon: Surgeon(s):  Michele Parker MD Memo, Mark A, DO    Procedure: Procedure(s):  LAPAROSCOPIC ROBOTIC XI ASSISTED SUPRACERVICAL HYSTERECTOMY BILATERAL SALPINGO OOPHORECTOMY POSTERIOR COLPORRHAPHY AND LEVATOR MYORRHAPHY  CYSTOSCOPY RIGHT EXTERNAL URETERAL CATHETER INSERTION LAPAROSCOPIC ROBOTIC XI ASSISTED ABDOMINAL SACRAL COLPOPEXY WITH Y MESH    Medications prior to admission:   Prior to Admission medications    Medication Sig Start Date End Date Taking? Authorizing Provider   b complex vitamins capsule Take 1 capsule by mouth daily   Yes ProviderArchana MD   rosuvastatin (CRESTOR) 10 MG tablet Take 1 tablet by mouth daily   Yes ProviderArchana MD   escitalopram (LEXAPRO) 10 MG tablet Take 1 tablet by mouth daily   Yes Provider, MD Archana   Omega-3 Fatty Acids (OMEGA-3 FISH OIL PO) Take by mouth daily   Yes ProviderArchana MD   triamterene-hydrochlorothiazide (MAXZIDE) 75-50 MG per tablet Take 1 tablet by mouth daily   Yes Provider, MD Archana   Probiotic Product (PROBIOTIC DAILY PO) Take 1 tablet by mouth daily   Yes Provider, MD Archana   potassium chloride SA (K-DUR;KLOR-CON M) 10 MEQ tablet Take 1 tablet by mouth daily Pt reports taking 2 tabs BID   Yes ProviderArchana MD   Cholecalciferol (VITAMIN D) 2000 UNITS CAPS capsule Take 1 capsule by mouth   Yes ProviderArchana MD   metoprolol (TOPROL-XL) 25 MG XL tablet Take 2 tablets by mouth daily   Yes ProviderArchana MD       Current medications:    Current Facility-Administered Medications   Medication Dose Route Frequency Provider Last Rate Last Admin    lidocaine 2000 mg in dextrose 5% 500 mL infusion  2 mg/kg/hr IntraVENous Once Angel Solis DO        sodium chloride flush 0.9 % injection 5-40 mL

## 2025-01-24 NOTE — H&P
Gynecologic History and Physical       CHIEF COMPLAINT:  Uterovaginal prolapse    HISTORY OF PRESENT ILLNESS:      Solange Morel is a 69 y.o. female, with stage 2-3 uterovaginal prolapse and rectocele, for combined robotic mesh repair with Dr Singh.  Neg cough reduction stress test.        Past Medical History:        Diagnosis Date    Bladder incontinence     mild    GERD (gastroesophageal reflux disease)     Hiatal hernia     Hyperlipidemia     Hypertension     Prolapse of female pelvic organs      Past Surgical History:        Procedure Laterality Date    BREAST BIOPSY Left     negative results    COLONOSCOPY  04/30/2015    COLONOSCOPY  09/23/2022    COLONOSCOPY WITH BIOPSY performed by Ramon Sanches MD at [facility] ENDOSCOPY    FINGER SURGERY Right     bone graft middle finger    LAPAROSCOPY      1995    LEE      2000    UPPER GASTROINTESTINAL ENDOSCOPY N/A 09/23/2022    EGD BIOPSY performed by Ramon Sanches MD at [facility] ENDOSCOPY     Allergies:  Patient has no known allergies.  Social History:    Social History     Socioeconomic History    Marital status:      Spouse name: Not on file    Number of children: Not on file    Years of education: Not on file    Highest education level: Not on file   Occupational History    Not on file   Tobacco Use    Smoking status: Never    Smokeless tobacco: Never   Vaping Use    Vaping status: Never Used   Substance and Sexual Activity    Alcohol use: No    Drug use: No    Sexual activity: Not on file   Other Topics Concern    Not on file   Social History Narrative    Not on file     Social Determinants of Health     Financial Resource Strain: Not on file   Food Insecurity: Not on file   Transportation Needs: Not on file   Physical Activity: Not on file   Stress: Not on file   Social Connections: Not on file   Intimate Partner Violence: Not on file   Housing Stability: Not on file     Family History:       Problem Relation Age of Onset    Allergy (Severe) Mother

## 2025-01-25 VITALS
DIASTOLIC BLOOD PRESSURE: 60 MMHG | WEIGHT: 147.93 LBS | TEMPERATURE: 98.6 F | RESPIRATION RATE: 18 BRPM | HEART RATE: 64 BPM | OXYGEN SATURATION: 95 % | SYSTOLIC BLOOD PRESSURE: 100 MMHG | HEIGHT: 66 IN | BODY MASS INDEX: 23.77 KG/M2

## 2025-01-25 LAB
ANION GAP SERPL CALCULATED.3IONS-SCNC: 11 MMOL/L (ref 7–16)
BASOPHILS # BLD: 0.01 K/UL (ref 0–0.2)
BASOPHILS NFR BLD: 0 % (ref 0–2)
BUN SERPL-MCNC: 17 MG/DL (ref 6–23)
CALCIUM SERPL-MCNC: 8.2 MG/DL (ref 8.6–10.2)
CHLORIDE SERPL-SCNC: 105 MMOL/L (ref 98–107)
CO2 SERPL-SCNC: 24 MMOL/L (ref 22–29)
CREAT SERPL-MCNC: 0.8 MG/DL (ref 0.5–1)
EOSINOPHIL # BLD: 0 K/UL (ref 0.05–0.5)
EOSINOPHILS RELATIVE PERCENT: 0 % (ref 0–6)
ERYTHROCYTE [DISTWIDTH] IN BLOOD BY AUTOMATED COUNT: 12.2 % (ref 11.5–15)
GFR, ESTIMATED: 76 ML/MIN/1.73M2
GLUCOSE SERPL-MCNC: 139 MG/DL (ref 74–99)
HCT VFR BLD AUTO: 36.9 % (ref 34–48)
HGB BLD-MCNC: 12.3 G/DL (ref 11.5–15.5)
IMM GRANULOCYTES # BLD AUTO: 0.04 K/UL (ref 0–0.58)
IMM GRANULOCYTES NFR BLD: 0 % (ref 0–5)
LYMPHOCYTES NFR BLD: 1.14 K/UL (ref 1.5–4)
LYMPHOCYTES RELATIVE PERCENT: 9 % (ref 20–42)
MCH RBC QN AUTO: 31 PG (ref 26–35)
MCHC RBC AUTO-ENTMCNC: 33.3 G/DL (ref 32–34.5)
MCV RBC AUTO: 92.9 FL (ref 80–99.9)
MONOCYTES NFR BLD: 0.99 K/UL (ref 0.1–0.95)
MONOCYTES NFR BLD: 8 % (ref 2–12)
NEUTROPHILS NFR BLD: 82 % (ref 43–80)
NEUTS SEG NFR BLD: 10 K/UL (ref 1.8–7.3)
PLATELET # BLD AUTO: 201 K/UL (ref 130–450)
PMV BLD AUTO: 10.5 FL (ref 7–12)
POTASSIUM SERPL-SCNC: 3.4 MMOL/L (ref 3.5–5)
RBC # BLD AUTO: 3.97 M/UL (ref 3.5–5.5)
SODIUM SERPL-SCNC: 140 MMOL/L (ref 132–146)
WBC OTHER # BLD: 12.2 K/UL (ref 4.5–11.5)

## 2025-01-25 PROCEDURE — 2700000000 HC OXYGEN THERAPY PER DAY

## 2025-01-25 PROCEDURE — 6370000000 HC RX 637 (ALT 250 FOR IP): Performed by: OBSTETRICS & GYNECOLOGY

## 2025-01-25 PROCEDURE — 80048 BASIC METABOLIC PNL TOTAL CA: CPT

## 2025-01-25 PROCEDURE — 85025 COMPLETE CBC W/AUTO DIFF WBC: CPT

## 2025-01-25 PROCEDURE — G0378 HOSPITAL OBSERVATION PER HR: HCPCS

## 2025-01-25 RX ORDER — HYDROCODONE BITARTRATE AND ACETAMINOPHEN 5; 325 MG/1; MG/1
1 TABLET ORAL EVERY 6 HOURS PRN
Qty: 20 TABLET | Refills: 0 | Status: SHIPPED | OUTPATIENT
Start: 2025-01-25 | End: 2025-02-01

## 2025-01-25 RX ORDER — IBUPROFEN 800 MG/1
800 TABLET, FILM COATED ORAL EVERY 8 HOURS PRN
Qty: 24 TABLET | Refills: 0 | Status: SHIPPED | OUTPATIENT
Start: 2025-01-25

## 2025-01-25 RX ADMIN — ESCITALOPRAM OXALATE 10 MG: 10 TABLET ORAL at 08:12

## 2025-01-25 RX ADMIN — ACETAMINOPHEN 650 MG: 325 TABLET ORAL at 03:58

## 2025-01-25 RX ADMIN — DOCUSATE SODIUM 100 MG: 100 CAPSULE, LIQUID FILLED ORAL at 08:11

## 2025-01-25 RX ADMIN — SIMETHICONE 80 MG: 80 TABLET, CHEWABLE ORAL at 08:12

## 2025-01-25 RX ADMIN — ROSUVASTATIN CALCIUM 10 MG: 10 TABLET, FILM COATED ORAL at 08:11

## 2025-01-25 RX ADMIN — POTASSIUM CHLORIDE 10 MEQ: 750 TABLET, EXTENDED RELEASE ORAL at 08:12

## 2025-01-25 RX ADMIN — SIMETHICONE 80 MG: 80 TABLET, CHEWABLE ORAL at 12:09

## 2025-01-25 ASSESSMENT — PAIN DESCRIPTION - LOCATION: LOCATION: ABDOMEN

## 2025-01-25 ASSESSMENT — PAIN SCALES - GENERAL: PAINLEVEL_OUTOF10: 3

## 2025-01-25 ASSESSMENT — PAIN - FUNCTIONAL ASSESSMENT: PAIN_FUNCTIONAL_ASSESSMENT: ACTIVITIES ARE NOT PREVENTED

## 2025-01-25 NOTE — OP NOTE
[place] [address]                            OPERATIVE REPORT      PATIENT NAME: CLAUDE MCFADDEN                : 1955  MED REC NO: [id number]                        ROOM: [facility]  ACCOUNT NO: [id number]                       ADMIT DATE: 2025  PROVIDER: Michele Parker MD      DATE OF PROCEDURE:  2025    SURGEON:  Michele Parker MD    PREOPERATIVE DIAGNOSIS:  Uterovaginal prolapse.    POSTOPERATIVE DIAGNOSES:  Uterovaginal prolapse, pelvic adhesions as well as fibroid uterus.    PROCEDURE:  Robotic-assisted supracervical hysterectomy with bilateral salpingo-oophorectomies, adhesiolysis.    FIRST ASSISTANT:  Sarah Mason PA-C, whose presence was necessary throughout the procedure for uterine manipulation in the absence of a qualified resident.    ANESTHESIA:  General.    FLUIDS:  IV crystalloid.    COMPLICATIONS:  None.    EBL:  5 mL.    SPECIMENS:  Supracervical hysterectomy, tubes and ovaries.    CONDITION:  Stable.    DRAINS:  Galvez.    FINDINGS:  Fibroid uterus with stage II to III anterior and stage II posterior vaginal wall prolapse.    CONDITION:  Stable.    DESCRIPTION OF PROCEDURE:  The patient was brought to the [facility] operating suite and prepped and draped in usual fashion on the [facility] bed.  Ports were placed and robot was docked per Dr. Singh as was cystoscopy and right ureteral stenting.  The console was taken over at this point using a vessel sealer.  Initially, monopolar scissors and [place] grasper to take down some colon adhesions from the right pelvic sidewall.  At this point, the monopolar scissors was switched to the vessel sealer device and beginning on the right, the infundibulopelvic ligament was cauterized in 3 contiguous places after identification of the stented ureter and transected as was the broad ligament, round ligament, the anterior leaf of the broad ligament extended in

## 2025-01-25 NOTE — PROGRESS NOTES
Post-Operative Note    POD#1    S:  Patient without complaints.   Nausea improved.        Flatus:  No.  Bowel movement:  No.      O: BP (!) 90/49   Pulse 61   Temp 98.6 °F (37 °C) (Oral)   Resp 18   Ht 1.676 m (5' 5.98\")   Wt 67.1 kg (147 lb 14.9 oz)   SpO2 98%   BMI 23.89 kg/m²                   ABD:   BS positive. Soft, non-distended.            Incisions dry.           EXT:     No Radha's.         LABS:  Hemoglobin/Hematocrit:    Lab Results   Component Value Date/Time    HGB 12.3 01/25/2025 04:05 AM    HCT 36.9 01/25/2025 04:05 AM          No results found.  IMP:  1.  POD#1, status-post robotic-assisted COLLEEN/BSO, ASC, cysto and stent with removal per Dr Singh            2.    Patient Active Problem List   Diagnosis    Phalanx, proximal fracture of finger    Bone lesion    Uterovaginal prolapse        Plan: 1.  Routine post-operative care           2.  Discharge to home           3.  Follow-up in office as directed                 Electronically signed by Michele Parker MD on 1/25/2025 at 8:23 AM

## 2025-01-25 NOTE — PLAN OF CARE
Problem: Discharge Planning  Goal: Discharge to home or other facility with appropriate resources  1/25/2025 0733 by Yumiko Hyman RN  Outcome: Progressing  1/24/2025 2035 by Mercedes Weber RN  Outcome: Progressing  1/24/2025 1840 by Glo Dimas RN  Outcome: Progressing     Problem: Pain  Goal: Verbalizes/displays adequate comfort level or baseline comfort level  1/25/2025 0733 by Yumiko Hyman RN  Outcome: Progressing  1/24/2025 2035 by Mercedes Weber RN  Outcome: Progressing  1/24/2025 1840 by Glo Dimas RN  Outcome: Progressing     Problem: Safety - Adult  Goal: Free from fall injury  1/25/2025 0733 by Yumiko Hyman RN  Outcome: Progressing  1/24/2025 2035 by Mercedes Weber RN  Outcome: Progressing  1/24/2025 1840 by Glo Dimas RN  Outcome: Progressing     Problem: ABCDS Injury Assessment  Goal: Absence of physical injury  1/25/2025 0733 by Yumiko Hyman RN  Outcome: Progressing  1/24/2025 2035 by Mercedes Weber RN  Outcome: Progressing  1/24/2025 1840 by Glo Dimas RN  Outcome: Progressing

## 2025-01-27 ENCOUNTER — CARE COORDINATION (OUTPATIENT)
Dept: OTHER | Facility: CLINIC | Age: 70
End: 2025-01-27

## 2025-01-27 NOTE — CARE COORDINATION
Care Transitions Note    Initial Call - Call within 2 business days of discharge: Yes    Patient Current Location:  Home: [address] [place] [id number]    Care Transition Nurse contacted the patient by telephone to perform post hospital discharge assessment, verified name and  as identifiers. Provided introduction to self, and explanation of the Care Transition Nurse role.     Patient: Solange Morel    Patient : 1955   MRN: [id number]    Reason for Admission: Uterovaginal prolapse  Discharge Date: 25  RURS: No data recorded    Last Discharge Facility       Date Complaint Diagnosis Description Type Department Provider    25  Post-op pain ... Admission (Discharged) Michele Richey MD            Was this an external facility discharge? No    Additional needs identified to be addressed with provider   No needs identified             Method of communication with provider: none.    Patients top risk factors for readmission: functional physical ability and medical condition-post op    Interventions to address risk factors:   Education:  ACM discussed RED FLAGS and encouraged patient to contact 911 for life threatening emergencies and PCP office  for non life-threatening symptoms.    Review of patient management of conditions/medications: Patient instructed to refrain from exceeding 1000 mg every 6 hours or 4000 mg of acetaminophen in 24 hour period.  Patient instructed to refrain from exceeding 800 mg every 6 hours or 3200 mg of Ibuprofen, Advil, Motrin in 24 hour period.        Care Summary Note: Patient c/o soreness when getting up and walking.  Patient states unable to rate, but pain is internal not incisional.  Patient reports pain is better today then Friday.  Patient denies pain when laying down.  Patient states has not taken Norco since yesterday morning; patient states medication makes patient goofy.  Patient instructed to refrain from drinking or driving when taking

## 2025-01-30 LAB — SURGICAL PATHOLOGY REPORT: NORMAL

## 2025-02-06 ENCOUNTER — CARE COORDINATION (OUTPATIENT)
Dept: OTHER | Facility: CLINIC | Age: 70
End: 2025-02-06

## 2025-02-06 NOTE — CARE COORDINATION
Care Transitions Note    Final Call     Patient Current Location:  [place]    Care Transition Nurse contacted the patient by telephone. Verified name and  as identifiers.    Patient closed (declined further YESY services) from the Care Transitions program on 25.  Patient/family verbalizes confidence in the ability to self-manage at this time. Has appt with Dr. Parker on 25 and Dr Singh on 25 .      Advance Care Planning:   Does patient have an Advance Directive:  Did not discuss .    Handoff:   Patient was not referred to the ACM team due to patient declined services.      Care Summary Note: Patient reports pain is controlled without pain medication.  Patient is ambulating in the home.  Per patient surgical blue is 50% gone; reminded patient to refrain from picking at glue.  Patient instructed to wash incisions gently, pat incisions thoroughly dry and to refrain from applying lotions, creams, ointments, or hydrogen peroxide unless directed by physician.  Patient denies drainage from incisions; denies s/s of infection.  Patient instructed on s/s of infection to report including but not limited to fever, chills, foul smelling drainage, increased warmth, redness or hardness around incision.  Patient c/o being sore and tired; aware it will take time to heal.  Patient reports appetite is improving; eating three meals daily.  Patient reports eats a smaller breakfast and lunch and larger dinner.  Patient instructed on high protein diet, Vitamin C and hydration for incision healing.  Patient reports has not had bowel movement x 3 days.  Patient had previously stopped taking Colace but has resumed.  Discussed ambulation and hydration as well as Colace for GI motility.    Patient reports incontinence is improving; patient is utilizing incontinence pad.  Patient instructed to change pad frequently.  Patient states was advised by provider incontinence post op could occur.      Patient denies any further needs,

## 2025-04-22 NOTE — PROGRESS NOTES
[facility] PRE-ADMISSION TESTING INSTRUCTIONS    The Preadmission Testing patient is instructed accordingly using the following criteria (check applicable):    ARRIVAL INSTRUCTIONS:  [x] Parking the day of Surgery is located in the Main Entrance lot.  Upon entering the door, make an immediate right to the surgery reception desk    [x] Bring photo ID and insurance card    [x] Bring in a copy of Living will or Durable Power of  papers.    [x] Please be sure to arrange for a responsible adult to provide transportation to and from the hospital    [x] Please arrange for a responsible adult to be with you for the 24 hour period post procedure due to having anesthesia    [x] If you awake am of surgery not feeling well or have temperature >100 please call [phone]    GENERAL INSTRUCTIONS:    [x] No solid foods after midnight, no gum, candy or mints.   May have up to 8oz clear liquids up to 4 hours prior to procedure     [] Follow bowel prep as instructed per surgeon       [x] You may brush your teeth, do not swallow any toothpaste    [x] Take medications as instructed     [x] Stop herbal supplements and vitamins 5 days prior to procedure    [] No diabetic medications after midnight    [x] No tobacco products within 24 hours of surgery     [x] No alcohol or illegal drug use, marijuana included, within 24 hours of surgery.    [] Bring urine specimen day of surgery    [x] Shower or bath with soap, lather and rinse well, AM of Surgery, no lotion, powders or creams to surgical site    [x] Jewelry, body piercing's, eyeglasses, contact lenses and dentures are not permitted into surgery (bring cases)      [x] Please do not wear any nail polish, make up or hair products on the day of surgery    [x] You can expect a call the business day prior to procedure to notify you if your arrival time changes    [x] If you receive a survey after surgery we would greatly appreciate your comments    [x]

## 2025-04-22 NOTE — PROGRESS NOTES
Pt scheduled for OR 4/25/25.  Spoke with Dr. Micheal Sanches's office to request signed copy of EKG be sent to PAT for chart.

## 2025-04-24 ENCOUNTER — PREP FOR PROCEDURE (OUTPATIENT)
Dept: UROLOGY | Age: 70
End: 2025-04-24

## 2025-04-24 ENCOUNTER — ANESTHESIA EVENT (OUTPATIENT)
Dept: OPERATING ROOM | Age: 70
End: 2025-04-24
Payer: COMMERCIAL

## 2025-04-24 RX ORDER — SODIUM CHLORIDE 9 MG/ML
INJECTION, SOLUTION INTRAVENOUS CONTINUOUS
Status: CANCELLED | OUTPATIENT
Start: 2025-04-24

## 2025-04-24 RX ORDER — SODIUM CHLORIDE 9 MG/ML
INJECTION, SOLUTION INTRAVENOUS PRN
Status: CANCELLED | OUTPATIENT
Start: 2025-04-24

## 2025-04-24 RX ORDER — SODIUM CHLORIDE 0.9 % (FLUSH) 0.9 %
5-40 SYRINGE (ML) INJECTION EVERY 12 HOURS SCHEDULED
Status: CANCELLED | OUTPATIENT
Start: 2025-04-24

## 2025-04-24 RX ORDER — SODIUM CHLORIDE 0.9 % (FLUSH) 0.9 %
5-40 SYRINGE (ML) INJECTION PRN
Status: CANCELLED | OUTPATIENT
Start: 2025-04-24

## 2025-04-24 ASSESSMENT — LIFESTYLE VARIABLES: SMOKING_STATUS: 0

## 2025-04-24 NOTE — ANESTHESIA PRE PROCEDURE
pertinent studies from chart review.  Above represents information available via the shared medical record including previous anesthetic, medication, and allergy history.  Confirmation of above and final disposition per DOS anesthesiologist.    DO Leeann Zhang Anesthesiologist  April 24, 2025  10:07 AM     DOS STAFF ADDENDUM:    Patient seen and chart reviewed on DOS.  No interval change in history or exam.  I agree with the anesthesia pre-operative assessment written above and have made the appropriate addendums and/or changes.  Anesthesia plan discussed and risks/benefits addressed.  Patient's concerns and questions answered.  NPO >8 hours.     DO Leeann Zhang Anesthesiologist  April 25, 2025  9:04 AM       Pt seen and evaluated pre-procedure.  Risks and benefits of anesthetic plan discussed as per custom.    RITU Bell - CRNA

## 2025-04-25 ENCOUNTER — ANESTHESIA (OUTPATIENT)
Dept: OPERATING ROOM | Age: 70
End: 2025-04-25
Payer: COMMERCIAL

## 2025-04-25 ENCOUNTER — HOSPITAL ENCOUNTER (OUTPATIENT)
Age: 70
Setting detail: OUTPATIENT SURGERY
Discharge: HOME | End: 2025-04-25
Attending: UROLOGY | Admitting: UROLOGY
Payer: COMMERCIAL

## 2025-04-25 VITALS
BODY MASS INDEX: 23.63 KG/M2 | WEIGHT: 147 LBS | HEART RATE: 50 BPM | OXYGEN SATURATION: 96 % | TEMPERATURE: 97.1 F | HEIGHT: 66 IN | RESPIRATION RATE: 13 BRPM | DIASTOLIC BLOOD PRESSURE: 71 MMHG | SYSTOLIC BLOOD PRESSURE: 130 MMHG

## 2025-04-25 DIAGNOSIS — G89.18 POST-OP PAIN: Primary | ICD-10-CM

## 2025-04-25 LAB
ANION GAP SERPL CALCULATED.3IONS-SCNC: 15 MMOL/L (ref 7–16)
BUN SERPL-MCNC: 18 MG/DL (ref 6–23)
CALCIUM SERPL-MCNC: 9.4 MG/DL (ref 8.6–10.2)
CHLORIDE SERPL-SCNC: 104 MMOL/L (ref 98–107)
CO2 SERPL-SCNC: 21 MMOL/L (ref 22–29)
CREAT SERPL-MCNC: 0.9 MG/DL (ref 0.5–1)
EKG ATRIAL RATE: 54 BPM
EKG P AXIS: 61 DEGREES
EKG P-R INTERVAL: 180 MS
EKG Q-T INTERVAL: 462 MS
EKG QRS DURATION: 138 MS
EKG QTC CALCULATION (BAZETT): 438 MS
EKG R AXIS: -33 DEGREES
EKG T AXIS: -13 DEGREES
EKG VENTRICULAR RATE: 54 BPM
GFR, ESTIMATED: 66 ML/MIN/1.73M2
GLUCOSE SERPL-MCNC: 114 MG/DL (ref 74–99)
POTASSIUM SERPL-SCNC: 4 MMOL/L (ref 3.5–5)
SODIUM SERPL-SCNC: 140 MMOL/L (ref 132–146)

## 2025-04-25 PROCEDURE — 7100000000 HC PACU RECOVERY - FIRST 15 MIN: Performed by: UROLOGY

## 2025-04-25 PROCEDURE — 7100000010 HC PHASE II RECOVERY - FIRST 15 MIN: Performed by: UROLOGY

## 2025-04-25 PROCEDURE — 7100000011 HC PHASE II RECOVERY - ADDTL 15 MIN: Performed by: UROLOGY

## 2025-04-25 PROCEDURE — 2500000003 HC RX 250 WO HCPCS: Performed by: NURSE ANESTHETIST, CERTIFIED REGISTERED

## 2025-04-25 PROCEDURE — 6360000002 HC RX W HCPCS: Performed by: ANESTHESIOLOGY

## 2025-04-25 PROCEDURE — 3700000001 HC ADD 15 MINUTES (ANESTHESIA): Performed by: UROLOGY

## 2025-04-25 PROCEDURE — 3700000000 HC ANESTHESIA ATTENDED CARE: Performed by: UROLOGY

## 2025-04-25 PROCEDURE — 6360000002 HC RX W HCPCS: Performed by: NURSE ANESTHETIST, CERTIFIED REGISTERED

## 2025-04-25 PROCEDURE — 2500000003 HC RX 250 WO HCPCS

## 2025-04-25 PROCEDURE — 2580000003 HC RX 258: Performed by: ANESTHESIOLOGY

## 2025-04-25 PROCEDURE — 80048 BASIC METABOLIC PNL TOTAL CA: CPT

## 2025-04-25 PROCEDURE — 6370000000 HC RX 637 (ALT 250 FOR IP): Performed by: ANESTHESIOLOGY

## 2025-04-25 PROCEDURE — 3600000003 HC SURGERY LEVEL 3 BASE: Performed by: UROLOGY

## 2025-04-25 PROCEDURE — 2709999900 HC NON-CHARGEABLE SUPPLY: Performed by: UROLOGY

## 2025-04-25 PROCEDURE — 2580000003 HC RX 258: Performed by: NURSE ANESTHETIST, CERTIFIED REGISTERED

## 2025-04-25 PROCEDURE — C1771 REP DEV, URINARY, W/SLING: HCPCS | Performed by: UROLOGY

## 2025-04-25 PROCEDURE — 6360000002 HC RX W HCPCS

## 2025-04-25 PROCEDURE — 2500000003 HC RX 250 WO HCPCS: Performed by: UROLOGY

## 2025-04-25 PROCEDURE — 3600000013 HC SURGERY LEVEL 3 ADDTL 15MIN: Performed by: UROLOGY

## 2025-04-25 PROCEDURE — 7100000001 HC PACU RECOVERY - ADDTL 15 MIN: Performed by: UROLOGY

## 2025-04-25 DEVICE — SUPRAPUBIC MID-URETHRAL SLING
Type: IMPLANTABLE DEVICE | Site: VAGINA | Status: FUNCTIONAL
Brand: LYNX™ SYSTEM

## 2025-04-25 RX ORDER — MEPERIDINE HYDROCHLORIDE 50 MG/ML
12.5 INJECTION INTRAMUSCULAR; INTRAVENOUS; SUBCUTANEOUS EVERY 5 MIN PRN
Status: DISCONTINUED | OUTPATIENT
Start: 2025-04-25 | End: 2025-04-25 | Stop reason: HOSPADM

## 2025-04-25 RX ORDER — LABETALOL HYDROCHLORIDE 5 MG/ML
5 INJECTION, SOLUTION INTRAVENOUS
Status: DISCONTINUED | OUTPATIENT
Start: 2025-04-25 | End: 2025-04-25 | Stop reason: HOSPADM

## 2025-04-25 RX ORDER — FENTANYL CITRATE 50 UG/ML
INJECTION, SOLUTION INTRAMUSCULAR; INTRAVENOUS
Status: DISCONTINUED | OUTPATIENT
Start: 2025-04-25 | End: 2025-04-25 | Stop reason: SDUPTHER

## 2025-04-25 RX ORDER — PROCHLORPERAZINE EDISYLATE 5 MG/ML
5 INJECTION INTRAMUSCULAR; INTRAVENOUS
Status: DISCONTINUED | OUTPATIENT
Start: 2025-04-25 | End: 2025-04-25 | Stop reason: HOSPADM

## 2025-04-25 RX ORDER — BUPIVACAINE HYDROCHLORIDE AND EPINEPHRINE 2.5; 5 MG/ML; UG/ML
INJECTION, SOLUTION EPIDURAL; INFILTRATION; INTRACAUDAL; PERINEURAL PRN
Status: DISCONTINUED | OUTPATIENT
Start: 2025-04-25 | End: 2025-04-25 | Stop reason: ALTCHOICE

## 2025-04-25 RX ORDER — SODIUM CHLORIDE 0.9 % (FLUSH) 0.9 %
5-40 SYRINGE (ML) INJECTION PRN
Status: DISCONTINUED | OUTPATIENT
Start: 2025-04-25 | End: 2025-04-25 | Stop reason: HOSPADM

## 2025-04-25 RX ORDER — ROCURONIUM BROMIDE 10 MG/ML
INJECTION, SOLUTION INTRAVENOUS
Status: DISCONTINUED | OUTPATIENT
Start: 2025-04-25 | End: 2025-04-25 | Stop reason: SDUPTHER

## 2025-04-25 RX ORDER — ONDANSETRON 2 MG/ML
INJECTION INTRAMUSCULAR; INTRAVENOUS
Status: DISCONTINUED | OUTPATIENT
Start: 2025-04-25 | End: 2025-04-25 | Stop reason: SDUPTHER

## 2025-04-25 RX ORDER — SODIUM CHLORIDE 9 MG/ML
INJECTION, SOLUTION INTRAVENOUS PRN
Status: DISCONTINUED | OUTPATIENT
Start: 2025-04-25 | End: 2025-04-25 | Stop reason: HOSPADM

## 2025-04-25 RX ORDER — LIDOCAINE HYDROCHLORIDE 20 MG/ML
INJECTION, SOLUTION EPIDURAL; INFILTRATION; INTRACAUDAL; PERINEURAL
Status: DISCONTINUED | OUTPATIENT
Start: 2025-04-25 | End: 2025-04-25 | Stop reason: SDUPTHER

## 2025-04-25 RX ORDER — DEXAMETHASONE SODIUM PHOSPHATE 4 MG/ML
INJECTION, SOLUTION INTRA-ARTICULAR; INTRALESIONAL; INTRAMUSCULAR; INTRAVENOUS; SOFT TISSUE
Status: DISCONTINUED | OUTPATIENT
Start: 2025-04-25 | End: 2025-04-25 | Stop reason: SDUPTHER

## 2025-04-25 RX ORDER — CEPHALEXIN 500 MG/1
500 CAPSULE ORAL 2 TIMES DAILY
Qty: 14 CAPSULE | Refills: 0 | Status: SHIPPED | OUTPATIENT
Start: 2025-04-25 | End: 2025-05-02

## 2025-04-25 RX ORDER — NALOXONE HYDROCHLORIDE 0.4 MG/ML
INJECTION, SOLUTION INTRAMUSCULAR; INTRAVENOUS; SUBCUTANEOUS PRN
Status: DISCONTINUED | OUTPATIENT
Start: 2025-04-25 | End: 2025-04-25 | Stop reason: HOSPADM

## 2025-04-25 RX ORDER — DIPHENHYDRAMINE HYDROCHLORIDE 50 MG/ML
12.5 INJECTION, SOLUTION INTRAMUSCULAR; INTRAVENOUS
Status: DISCONTINUED | OUTPATIENT
Start: 2025-04-25 | End: 2025-04-25 | Stop reason: HOSPADM

## 2025-04-25 RX ORDER — OXYCODONE AND ACETAMINOPHEN 5; 325 MG/1; MG/1
1 TABLET ORAL EVERY 4 HOURS PRN
Qty: 10 TABLET | Refills: 0 | Status: SHIPPED | OUTPATIENT
Start: 2025-04-25 | End: 2025-05-02

## 2025-04-25 RX ORDER — SODIUM CHLORIDE 9 MG/ML
INJECTION, SOLUTION INTRAVENOUS
Status: DISCONTINUED | OUTPATIENT
Start: 2025-04-25 | End: 2025-04-25 | Stop reason: SDUPTHER

## 2025-04-25 RX ORDER — SODIUM CHLORIDE 9 MG/ML
INJECTION, SOLUTION INTRAVENOUS CONTINUOUS
Status: DISCONTINUED | OUTPATIENT
Start: 2025-04-25 | End: 2025-04-25 | Stop reason: HOSPADM

## 2025-04-25 RX ORDER — FENTANYL CITRATE 50 UG/ML
25 INJECTION, SOLUTION INTRAMUSCULAR; INTRAVENOUS EVERY 5 MIN PRN
Status: DISCONTINUED | OUTPATIENT
Start: 2025-04-25 | End: 2025-04-25 | Stop reason: HOSPADM

## 2025-04-25 RX ORDER — ACETAMINOPHEN 500 MG
1000 TABLET ORAL ONCE
Status: COMPLETED | OUTPATIENT
Start: 2025-04-25 | End: 2025-04-25

## 2025-04-25 RX ORDER — MIDAZOLAM HYDROCHLORIDE 1 MG/ML
INJECTION, SOLUTION INTRAMUSCULAR; INTRAVENOUS
Status: DISCONTINUED | OUTPATIENT
Start: 2025-04-25 | End: 2025-04-25 | Stop reason: SDUPTHER

## 2025-04-25 RX ORDER — HYDRALAZINE HYDROCHLORIDE 20 MG/ML
5 INJECTION INTRAMUSCULAR; INTRAVENOUS
Status: DISCONTINUED | OUTPATIENT
Start: 2025-04-25 | End: 2025-04-25 | Stop reason: HOSPADM

## 2025-04-25 RX ORDER — PROPOFOL 10 MG/ML
INJECTION, EMULSION INTRAVENOUS
Status: DISCONTINUED | OUTPATIENT
Start: 2025-04-25 | End: 2025-04-25 | Stop reason: SDUPTHER

## 2025-04-25 RX ORDER — SODIUM CHLORIDE 0.9 % (FLUSH) 0.9 %
5-40 SYRINGE (ML) INJECTION EVERY 12 HOURS SCHEDULED
Status: DISCONTINUED | OUTPATIENT
Start: 2025-04-25 | End: 2025-04-25 | Stop reason: HOSPADM

## 2025-04-25 RX ORDER — METOCLOPRAMIDE HYDROCHLORIDE 5 MG/ML
10 INJECTION INTRAMUSCULAR; INTRAVENOUS ONCE
Status: COMPLETED | OUTPATIENT
Start: 2025-04-25 | End: 2025-04-25

## 2025-04-25 RX ADMIN — DEXAMETHASONE SODIUM PHOSPHATE 10 MG: 4 INJECTION, SOLUTION INTRAMUSCULAR; INTRAVENOUS at 10:11

## 2025-04-25 RX ADMIN — Medication 20 MG: at 10:06

## 2025-04-25 RX ADMIN — ROCURONIUM BROMIDE 40 MG: 10 INJECTION, SOLUTION INTRAVENOUS at 10:06

## 2025-04-25 RX ADMIN — HYDROMORPHONE HYDROCHLORIDE 0.5 MG: 1 INJECTION, SOLUTION INTRAMUSCULAR; INTRAVENOUS; SUBCUTANEOUS at 11:28

## 2025-04-25 RX ADMIN — PROPOFOL 140 MG: 10 INJECTION, EMULSION INTRAVENOUS at 10:06

## 2025-04-25 RX ADMIN — MIDAZOLAM 2 MG: 1 INJECTION INTRAMUSCULAR; INTRAVENOUS at 09:55

## 2025-04-25 RX ADMIN — FENTANYL CITRATE 100 MCG: 50 INJECTION, SOLUTION INTRAMUSCULAR; INTRAVENOUS at 10:06

## 2025-04-25 RX ADMIN — METOCLOPRAMIDE 10 MG: 5 INJECTION, SOLUTION INTRAMUSCULAR; INTRAVENOUS at 08:55

## 2025-04-25 RX ADMIN — SODIUM CHLORIDE: 9 INJECTION, SOLUTION INTRAVENOUS at 09:49

## 2025-04-25 RX ADMIN — LIDOCAINE HYDROCHLORIDE 100 MG: 20 INJECTION, SOLUTION EPIDURAL; INFILTRATION; INTRACAUDAL; PERINEURAL at 10:06

## 2025-04-25 RX ADMIN — ONDANSETRON 4 MG: 2 INJECTION, SOLUTION INTRAMUSCULAR; INTRAVENOUS at 10:42

## 2025-04-25 RX ADMIN — WATER 2000 MG: 1 INJECTION INTRAMUSCULAR; INTRAVENOUS; SUBCUTANEOUS at 10:11

## 2025-04-25 RX ADMIN — ACETAMINOPHEN 1000 MG: 500 TABLET ORAL at 08:55

## 2025-04-25 RX ADMIN — FAMOTIDINE 20 MG: 10 INJECTION, SOLUTION INTRAVENOUS at 08:55

## 2025-04-25 ASSESSMENT — PAIN DESCRIPTION - LOCATION: LOCATION: ABDOMEN

## 2025-04-25 ASSESSMENT — PAIN SCALES - GENERAL: PAINLEVEL_OUTOF10: 8

## 2025-04-25 ASSESSMENT — PAIN DESCRIPTION - DESCRIPTORS: DESCRIPTORS: DISCOMFORT;PRESSURE

## 2025-04-25 ASSESSMENT — PAIN - FUNCTIONAL ASSESSMENT: PAIN_FUNCTIONAL_ASSESSMENT: 0-10

## 2025-04-25 NOTE — DISCHARGE INSTRUCTIONS
Follow up Dr. Rubén Singh in 1-4 week, [phone]    When you are discharged, it is okay to climb stairs, take a shower, and no heavy lifting greater than 5-10 pounds.  You will need to call the office to make an appointment to see Dr. Singh/Emily/Seun/Eamon in 10-14 days.  Pain medications will be written as well as antibiotics.  Hold on Driving until cleared by the physician.  Any questions or concerns arise call the office, [phone].    Place the patients weiss to leg bag on discharge from the hospital.  Please give the patient a night bag (large bag) and weiss instructions upon discharge.  Please have the patient contact the office for weiss removal instructions.  The catheter may go red (hematuria), may go clear, and may go red.  This is a normal process, as long as the catheter is draining.  Call the office if any concerns should arise for further instructions, [phone].    Weiss out in the AM by the patient

## 2025-04-25 NOTE — ANESTHESIA POSTPROCEDURE EVALUATION
Department of Anesthesiology  Postprocedure Note    Patient: Solange Morel  MRN: [id number]  YOB: 1955  Date of evaluation: 4/25/2025    Procedure Summary       Date: 04/25/25 Room / Location: [address]    Anesthesia Start: 0949 Anesthesia Stop: 1108    Procedure: CYSTOSCOPY MIDURETHRAL SLING WITH LYNX (Vagina ) Diagnosis:       Cystocele, midline      (Cystocele, midline [N81.11])    Surgeons: Rubén Singh DO Responsible Provider: Sebas Mcdaniel DO    Anesthesia Type: General ASA Status: 3            Anesthesia Type: General    Justin Phase I: Justin Score: 10    Justin Phase II:      Anesthesia Post Evaluation    Patient location during evaluation: bedside  Patient participation: complete - patient participated  Level of consciousness: awake  Pain score: 2  Airway patency: patent  Nausea & Vomiting: no vomiting and no nausea  Cardiovascular status: hemodynamically stable  Respiratory status: acceptable  Hydration status: stable  Comments: Patient seen and examined.  Progressing as expected.  No anesthetic related questions or concerns at this time.  Multimodal analgesia pain management approach  Pain management: adequate    No notable events documented.    RITU Bell - CRNA

## 2025-04-25 NOTE — OP NOTE
Operative Note      Patient: Solange Morel  YOB: 1955  MRN: [id number]    Date of Procedure: 4/25/2025    Pre-Op Diagnosis Codes:      * Cystocele, midline [N81.11]    Post-Op Diagnosis: Same       Procedure(s):  CYSTOSCOPY MIDURETHRAL SLING WITH LYNX    Surgeon(s):  Rubén Singh DO    Assistant:   * No surgical staff found *    Anesthesia: General    Estimated Blood Loss (mL): Minimal    Complications: None    Specimens:   * No specimens in log *    Implants:  * No implants in log *      Drains: * No LDAs found *    Findings:  Infection Present At Time Of Surgery (PATOS) (choose all levels that have infection present):  No infection present  Other Findings: none    Detailed Description of Procedure:   4/25/2025 8:36 AM  Service: Urology  Group: [facility] (Francisco/Emily/Seun/Eamon)    Solange Morel  [id number]    SURGEON: Rubén Singh DO DO FACS  PREOPERATIVE DIAGNOSIS:   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   POSTOPERATIVE DIAGNOSIS: same  OPERATION: Cystoscopy, midurethral sling with Lynx by Neures[employer].   ANESTHESIA: General anesthesia.   ESTIMATED BLOOD LOSS: 50cc   SPECIMEN: None.   COMPLICATIONS: None.   CONDITION: PACU stable.   SPECIAL MEDICATION PREOPERATIVELY: Antibiotic    STORY ON THIS PATIENT: Solange Morel is a 70 y.o. female who   presented to [facility], on the   aforementioned date with the above diagnosis. In the outpatient setting, I   did consent the patient for this procedure. The patient understood the   risks, benefits and alternatives of the proposed surgical procedure and   consented the procedure done. Please note I did talk to her about the concerns made forth by the FDA about the use of vaginal meshes. They are very cognizant of this and still would like to proceed.     PROCEDURE:  Solange Morel was brought to the operating room, placed in a supine position, induced with general anesthesia.

## 2025-04-29 LAB
EKG ATRIAL RATE: 54 BPM
EKG P AXIS: 61 DEGREES
EKG P-R INTERVAL: 180 MS
EKG Q-T INTERVAL: 462 MS
EKG QRS DURATION: 138 MS
EKG QTC CALCULATION (BAZETT): 438 MS
EKG R AXIS: -33 DEGREES
EKG T AXIS: -13 DEGREES
EKG VENTRICULAR RATE: 54 BPM

## (undated) DEVICE — SYRINGE 20ML LL S/C 50

## (undated) DEVICE — JELLY,LUBE,STERILE,FLIP TOP,TUBE,2-OZ: Brand: MEDLINE

## (undated) DEVICE — INSTRUMENT CLAMP TOWEL LARGE REUSABLE

## (undated) DEVICE — SYRINGE MED 10ML TRNSLUC BRL PLUNG BLK MRK POLYPR CTRL

## (undated) DEVICE — CATHETER F BLLN 5CC 16FR 2 W HYDRGEL COAT LESS TRAUM LUB

## (undated) DEVICE — INSUFFLATION TUBING SET WITH FILTER, FUNNEL CONNECTOR AND LUER LOCK: Brand: JOSNOE MEDICAL INC

## (undated) DEVICE — KIT,ANTI FOG,W/SPONGE & FLUID,SOFT PACK: Brand: MEDLINE

## (undated) DEVICE — DECANTER: Brand: UNBRANDED

## (undated) DEVICE — FORCEPS BX OVL CUP FEN DISPOSABLE CAP L 160CM RAD JAW 4

## (undated) DEVICE — RAZOR SURG PREP PERSONNA NONSTER FIXED

## (undated) DEVICE — 1LYRTR 16FR10ML100%SIL UMS SNP: Brand: MEDLINE INDUSTRIES, INC.

## (undated) DEVICE — 3M™ STERI-DRAPE™ INCISE DRAPE 1050 (60CM X 45CM): Brand: STERI-DRAPE™

## (undated) DEVICE — DOUBLE BASIN SET: Brand: MEDLINE INDUSTRIES, INC.

## (undated) DEVICE — CATHETER F BLLN 5CC 18FR 2 W HYDRGEL COAT LESS TRAUM LUB

## (undated) DEVICE — BLADELESS OBTURATOR: Brand: WECK VISTA

## (undated) DEVICE — SOLUTION IV 1000ML 0.9% SOD CHL PH 5 INJ USP VIAFLX PLAS

## (undated) DEVICE — SOLUTION IRRIG 2000ML 0.9% SOD CHL USP UROMATIC PLAS CONT

## (undated) DEVICE — MARYLAND BIPOLAR FORCEPS: Brand: ENDOWRIST

## (undated) DEVICE — TIP-UP FENESTRATED GRASPER: Brand: ENDOWRIST

## (undated) DEVICE — VAGINAL PREP TRAY: Brand: MEDLINE INDUSTRIES, INC.

## (undated) DEVICE — BANDAGE,GAUZE,BULKEE II,4.5"X4.1YD,STRL: Brand: MEDLINE

## (undated) DEVICE — NEEDLE HYPO 25GA L1.5IN BLU POLYPR HUB S STL REG BVL STR

## (undated) DEVICE — CONNECTOR CATH URET SIDE PRT FOR FRIC CONN UCA595] GU TEK]

## (undated) DEVICE — GLOVE ORANGE PI 7 1/2   MSG9075

## (undated) DEVICE — SINGLE USE RETRACTOR RING; SL MODEL (SMALL/LARGE): Brand: LINA SEASTAR™

## (undated) DEVICE — GOWN,SIRUS,FABRNF,XL,20/CS: Brand: MEDLINE

## (undated) DEVICE — Device

## (undated) DEVICE — PACK,AURORA,LAVH: Brand: MEDLINE

## (undated) DEVICE — TIP COVER ACCESSORY

## (undated) DEVICE — GLOVE ORANGE PI 7   MSG9070

## (undated) DEVICE — Y-TYPE TUR/BLADDER IRRIGATION SET, REGULATING CLAMP

## (undated) DEVICE — SEAL

## (undated) DEVICE — BLADE,STAINLESS-STEEL,11,STRL,DISPOSABLE: Brand: MEDLINE

## (undated) DEVICE — PAD PT POS 36 IN SURGYPAD DISP

## (undated) DEVICE — LIQUIBAND RAPID ADHESIVE 36/CS 0.8ML: Brand: MEDLINE

## (undated) DEVICE — DRAPE,TOP,102X53,STERILE: Brand: MEDLINE

## (undated) DEVICE — APPLICATOR MEDICATED 26 CC SOLUTION HI LT ORNG CHLORAPREP

## (undated) DEVICE — GRADUATE TRIANG MEASURE 1000ML BLK PRNT

## (undated) DEVICE — COLUMN DRAPE

## (undated) DEVICE — GOWN,SIRUS,POLYRNF,BRTHSLV,XLN/XL,20/CS: Brand: MEDLINE

## (undated) DEVICE — WARMER SCP 2 ANTIFOG LAP DISP

## (undated) DEVICE — ZAMI/KRONNER 4.5, UTERINE MANIPULATOR/INJECTOR, 12/BX: Brand: MARINA MEDICAL

## (undated) DEVICE — MEGA SUTURECUT ND: Brand: ENDOWRIST

## (undated) DEVICE — DRAINBAG,ANTI-REFLUX TOWER,L/F,2000ML,LL: Brand: MEDLINE

## (undated) DEVICE — MONOPOLAR CURVED SCISSORS: Brand: ENDOWRIST

## (undated) DEVICE — CATHETER URET 5FR L70CM OPN END SGL LUMN INJ HUB FLEXIMA

## (undated) DEVICE — LARGE NEEDLE DRIVER: Brand: ENDOWRIST

## (undated) DEVICE — ARM DRAPE

## (undated) DEVICE — FORCEPS BX L240CM JAW DIA2.4MM ORNG L CAP W/ NDL DISP RAD

## (undated) DEVICE — PAD,SANITARY,11 IN,MAXI,N-STRL,IND WRAP: Brand: MEDLINE

## (undated) DEVICE — 4-PORT MANIFOLD: Brand: NEPTUNE 2

## (undated) DEVICE — TOWEL,OR,DSP,ST,BLUE,STD,6/PK,12PK/CS: Brand: MEDLINE

## (undated) DEVICE — SOLUTION IRRIG 1000ML STRL H2O USP PLAS POUR BTL

## (undated) DEVICE — BLOCK BITE 60FR RUBBER ADLT DENTAL

## (undated) DEVICE — SPONGE GZ W4XL4IN RAYON POLY FILL CVR W/ NONWOVEN FAB

## (undated) DEVICE — ELECTRODE PT RET AD L9FT HI MOIST COND ADH HYDRGEL CORDED

## (undated) DEVICE — MARKER,SKIN,WI/RULER AND LABELS: Brand: MEDLINE

## (undated) DEVICE — ENDOSCOPIC KIT CLN SWAB MICROFIBER CLTH SCP CLEANOR DISP